# Patient Record
Sex: MALE | Race: WHITE | NOT HISPANIC OR LATINO
[De-identification: names, ages, dates, MRNs, and addresses within clinical notes are randomized per-mention and may not be internally consistent; named-entity substitution may affect disease eponyms.]

---

## 2021-11-17 ENCOUNTER — NON-APPOINTMENT (OUTPATIENT)
Age: 71
End: 2021-11-17

## 2021-11-19 ENCOUNTER — APPOINTMENT (OUTPATIENT)
Dept: OPHTHALMOLOGY | Facility: CLINIC | Age: 71
End: 2021-11-19
Payer: COMMERCIAL

## 2021-11-19 ENCOUNTER — NON-APPOINTMENT (OUTPATIENT)
Age: 71
End: 2021-11-19

## 2021-11-19 PROCEDURE — 92025 CPTRIZED CORNEAL TOPOGRAPHY: CPT

## 2021-11-19 PROCEDURE — 92004 COMPRE OPH EXAM NEW PT 1/>: CPT

## 2021-11-19 PROCEDURE — 92136 OPHTHALMIC BIOMETRY: CPT

## 2021-12-06 PROBLEM — Z00.00 ENCOUNTER FOR PREVENTIVE HEALTH EXAMINATION: Status: ACTIVE | Noted: 2021-12-06

## 2022-01-20 ENCOUNTER — APPOINTMENT (OUTPATIENT)
Dept: OPHTHALMOLOGY | Facility: AMBULATORY SURGERY CENTER | Age: 72
End: 2022-01-20

## 2022-01-21 ENCOUNTER — APPOINTMENT (OUTPATIENT)
Dept: OPHTHALMOLOGY | Facility: CLINIC | Age: 72
End: 2022-01-21

## 2022-01-26 ENCOUNTER — APPOINTMENT (OUTPATIENT)
Dept: OPHTHALMOLOGY | Facility: CLINIC | Age: 72
End: 2022-01-26

## 2022-02-16 ENCOUNTER — APPOINTMENT (OUTPATIENT)
Dept: OPHTHALMOLOGY | Facility: CLINIC | Age: 72
End: 2022-02-16

## 2022-08-24 ENCOUNTER — EMERGENCY (EMERGENCY)
Facility: HOSPITAL | Age: 72
LOS: 1 days | Discharge: HOME | End: 2022-08-24
Attending: EMERGENCY MEDICINE | Admitting: EMERGENCY MEDICINE
Payer: COMMERCIAL

## 2022-08-24 VITALS
TEMPERATURE: 98 F | RESPIRATION RATE: 16 BRPM | DIASTOLIC BLOOD PRESSURE: 72 MMHG | HEART RATE: 57 BPM | OXYGEN SATURATION: 97 % | SYSTOLIC BLOOD PRESSURE: 189 MMHG

## 2022-08-24 VITALS
DIASTOLIC BLOOD PRESSURE: 90 MMHG | TEMPERATURE: 98 F | WEIGHT: 175.05 LBS | SYSTOLIC BLOOD PRESSURE: 188 MMHG | HEIGHT: 64 IN | OXYGEN SATURATION: 97 % | HEART RATE: 81 BPM | RESPIRATION RATE: 14 BRPM

## 2022-08-24 DIAGNOSIS — Z98.890 OTHER SPECIFIED POSTPROCEDURAL STATES: Chronic | ICD-10-CM

## 2022-08-24 LAB
ALBUMIN SERPL ELPH-MCNC: 3.9 G/DL — SIGNIFICANT CHANGE UP (ref 3.3–5)
ALP SERPL-CCNC: 97 U/L — SIGNIFICANT CHANGE UP (ref 40–120)
ALT FLD-CCNC: 17 U/L — SIGNIFICANT CHANGE UP (ref 12–78)
ANION GAP SERPL CALC-SCNC: 5 MMOL/L — SIGNIFICANT CHANGE UP (ref 5–17)
APPEARANCE UR: CLEAR — SIGNIFICANT CHANGE UP
AST SERPL-CCNC: 24 U/L — SIGNIFICANT CHANGE UP (ref 15–37)
BASOPHILS # BLD AUTO: 0.05 K/UL — SIGNIFICANT CHANGE UP (ref 0–0.2)
BASOPHILS NFR BLD AUTO: 0.4 % — SIGNIFICANT CHANGE UP (ref 0–2)
BILIRUB SERPL-MCNC: 0.5 MG/DL — SIGNIFICANT CHANGE UP (ref 0.2–1.2)
BILIRUB UR-MCNC: NEGATIVE — SIGNIFICANT CHANGE UP
BUN SERPL-MCNC: 23 MG/DL — SIGNIFICANT CHANGE UP (ref 7–23)
CALCIUM SERPL-MCNC: 9 MG/DL — SIGNIFICANT CHANGE UP (ref 8.5–10.1)
CHLORIDE SERPL-SCNC: 109 MMOL/L — HIGH (ref 96–108)
CO2 SERPL-SCNC: 26 MMOL/L — SIGNIFICANT CHANGE UP (ref 22–31)
COLOR SPEC: SIGNIFICANT CHANGE UP
CREAT SERPL-MCNC: 1.7 MG/DL — HIGH (ref 0.5–1.3)
DIFF PNL FLD: ABNORMAL
EGFR: 42 ML/MIN/1.73M2 — LOW
EOSINOPHIL # BLD AUTO: 0.1 K/UL — SIGNIFICANT CHANGE UP (ref 0–0.5)
EOSINOPHIL NFR BLD AUTO: 0.8 % — SIGNIFICANT CHANGE UP (ref 0–6)
GLUCOSE SERPL-MCNC: 119 MG/DL — HIGH (ref 70–99)
GLUCOSE UR QL: NEGATIVE — SIGNIFICANT CHANGE UP
HCT VFR BLD CALC: 43.3 % — SIGNIFICANT CHANGE UP (ref 39–50)
HGB BLD-MCNC: 14.5 G/DL — SIGNIFICANT CHANGE UP (ref 13–17)
IMM GRANULOCYTES NFR BLD AUTO: 0.5 % — SIGNIFICANT CHANGE UP (ref 0–1.5)
KETONES UR-MCNC: NEGATIVE — SIGNIFICANT CHANGE UP
LEUKOCYTE ESTERASE UR-ACNC: NEGATIVE — SIGNIFICANT CHANGE UP
LYMPHOCYTES # BLD AUTO: 1.4 K/UL — SIGNIFICANT CHANGE UP (ref 1–3.3)
LYMPHOCYTES # BLD AUTO: 10.9 % — LOW (ref 13–44)
MCHC RBC-ENTMCNC: 30.3 PG — SIGNIFICANT CHANGE UP (ref 27–34)
MCHC RBC-ENTMCNC: 33.5 GM/DL — SIGNIFICANT CHANGE UP (ref 32–36)
MCV RBC AUTO: 90.4 FL — SIGNIFICANT CHANGE UP (ref 80–100)
MONOCYTES # BLD AUTO: 1.16 K/UL — HIGH (ref 0–0.9)
MONOCYTES NFR BLD AUTO: 9 % — SIGNIFICANT CHANGE UP (ref 2–14)
NEUTROPHILS # BLD AUTO: 10.1 K/UL — HIGH (ref 1.8–7.4)
NEUTROPHILS NFR BLD AUTO: 78.4 % — HIGH (ref 43–77)
NITRITE UR-MCNC: NEGATIVE — SIGNIFICANT CHANGE UP
NRBC # BLD: 0 /100 WBCS — SIGNIFICANT CHANGE UP (ref 0–0)
PH UR: 7 — SIGNIFICANT CHANGE UP (ref 5–8)
PLATELET # BLD AUTO: 216 K/UL — SIGNIFICANT CHANGE UP (ref 150–400)
POTASSIUM SERPL-MCNC: 3.7 MMOL/L — SIGNIFICANT CHANGE UP (ref 3.5–5.3)
POTASSIUM SERPL-SCNC: 3.7 MMOL/L — SIGNIFICANT CHANGE UP (ref 3.5–5.3)
PROT SERPL-MCNC: 7.9 G/DL — SIGNIFICANT CHANGE UP (ref 6–8.3)
PROT UR-MCNC: 15
RBC # BLD: 4.79 M/UL — SIGNIFICANT CHANGE UP (ref 4.2–5.8)
RBC # FLD: 14.1 % — SIGNIFICANT CHANGE UP (ref 10.3–14.5)
SODIUM SERPL-SCNC: 140 MMOL/L — SIGNIFICANT CHANGE UP (ref 135–145)
SP GR SPEC: 1 — LOW (ref 1.01–1.02)
UROBILINOGEN FLD QL: NEGATIVE — SIGNIFICANT CHANGE UP
WBC # BLD: 12.87 K/UL — HIGH (ref 3.8–10.5)
WBC # FLD AUTO: 12.87 K/UL — HIGH (ref 3.8–10.5)

## 2022-08-24 PROCEDURE — 99285 EMERGENCY DEPT VISIT HI MDM: CPT | Mod: 25

## 2022-08-24 PROCEDURE — 74177 CT ABD & PELVIS W/CONTRAST: CPT | Mod: 26,MA

## 2022-08-24 PROCEDURE — 81001 URINALYSIS AUTO W/SCOPE: CPT

## 2022-08-24 PROCEDURE — 93005 ELECTROCARDIOGRAM TRACING: CPT

## 2022-08-24 PROCEDURE — 80053 COMPREHEN METABOLIC PANEL: CPT

## 2022-08-24 PROCEDURE — 99285 EMERGENCY DEPT VISIT HI MDM: CPT

## 2022-08-24 PROCEDURE — 87086 URINE CULTURE/COLONY COUNT: CPT

## 2022-08-24 PROCEDURE — 96374 THER/PROPH/DIAG INJ IV PUSH: CPT | Mod: XU

## 2022-08-24 PROCEDURE — 85025 COMPLETE CBC W/AUTO DIFF WBC: CPT

## 2022-08-24 PROCEDURE — 96375 TX/PRO/DX INJ NEW DRUG ADDON: CPT

## 2022-08-24 PROCEDURE — 74177 CT ABD & PELVIS W/CONTRAST: CPT | Mod: MA

## 2022-08-24 PROCEDURE — 36415 COLL VENOUS BLD VENIPUNCTURE: CPT

## 2022-08-24 PROCEDURE — 93010 ELECTROCARDIOGRAM REPORT: CPT

## 2022-08-24 RX ORDER — ACETAMINOPHEN 500 MG
1000 TABLET ORAL ONCE
Refills: 0 | Status: COMPLETED | OUTPATIENT
Start: 2022-08-24 | End: 2022-08-24

## 2022-08-24 RX ORDER — SODIUM CHLORIDE 9 MG/ML
1000 INJECTION INTRAMUSCULAR; INTRAVENOUS; SUBCUTANEOUS ONCE
Refills: 0 | Status: COMPLETED | OUTPATIENT
Start: 2022-08-24 | End: 2022-08-24

## 2022-08-24 RX ORDER — CEFUROXIME AXETIL 250 MG
1 TABLET ORAL
Qty: 14 | Refills: 0
Start: 2022-08-24 | End: 2022-08-30

## 2022-08-24 RX ORDER — ONDANSETRON 8 MG/1
4 TABLET, FILM COATED ORAL ONCE
Refills: 0 | Status: COMPLETED | OUTPATIENT
Start: 2022-08-24 | End: 2022-08-24

## 2022-08-24 RX ORDER — KETOROLAC TROMETHAMINE 30 MG/ML
15 SYRINGE (ML) INJECTION ONCE
Refills: 0 | Status: DISCONTINUED | OUTPATIENT
Start: 2022-08-24 | End: 2022-08-24

## 2022-08-24 RX ORDER — TAMSULOSIN HYDROCHLORIDE 0.4 MG/1
1 CAPSULE ORAL
Qty: 7 | Refills: 0
Start: 2022-08-24 | End: 2022-08-30

## 2022-08-24 RX ORDER — MORPHINE SULFATE 50 MG/1
4 CAPSULE, EXTENDED RELEASE ORAL ONCE
Refills: 0 | Status: DISCONTINUED | OUTPATIENT
Start: 2022-08-24 | End: 2022-08-24

## 2022-08-24 RX ORDER — OXYCODONE AND ACETAMINOPHEN 5; 325 MG/1; MG/1
1 TABLET ORAL
Qty: 12 | Refills: 0
Start: 2022-08-24 | End: 2022-08-26

## 2022-08-24 RX ADMIN — MORPHINE SULFATE 4 MILLIGRAM(S): 50 CAPSULE, EXTENDED RELEASE ORAL at 12:11

## 2022-08-24 RX ADMIN — ONDANSETRON 4 MILLIGRAM(S): 8 TABLET, FILM COATED ORAL at 12:11

## 2022-08-24 RX ADMIN — Medication 15 MILLIGRAM(S): at 13:28

## 2022-08-24 RX ADMIN — SODIUM CHLORIDE 1000 MILLILITER(S): 9 INJECTION INTRAMUSCULAR; INTRAVENOUS; SUBCUTANEOUS at 10:53

## 2022-08-24 RX ADMIN — Medication 400 MILLIGRAM(S): at 11:15

## 2022-08-24 NOTE — ED ADULT NURSE NOTE - NSICDXPASTMEDICALHX_GEN_ALL_CORE_FT
PAST MEDICAL HISTORY:  COPD exacerbation     Diverticulitis     Former smoker     Prostate cancer

## 2022-08-24 NOTE — ED PROVIDER NOTE - CLINICAL SUMMARY MEDICAL DECISION MAKING FREE TEXT BOX
Lurdes: pt here for abdominal pain, dx with kidney stone based on CT scan findings, now feeling much better, stable for discharge.

## 2022-08-24 NOTE — ED ADULT TRIAGE NOTE - CHIEF COMPLAINT QUOTE
"Since Monday Erin had bad pains in my stomach. I have a hx of diverticulitis so my doctor told me I need a cat scan"

## 2022-08-24 NOTE — ED PROVIDER NOTE - PATIENT PORTAL LINK FT
You can access the FollowMyHealth Patient Portal offered by [facility] by registering at the following website: [email or web address]. By joining [facility]’s FollowMyHealth portal, you will also be able to view your health information using other applications (apps) compatible with our system.

## 2022-08-24 NOTE — ED ADULT NURSE NOTE - OBJECTIVE STATEMENT
Rceived pt in bed alert and oriented x4.  C/O abdominal pain Received pt in bed alert and oriented x4.  C/O abdominal pain left lower abdominal pain which started 3 days ago.  Pt also had constipation.  pt denies any n/v/d.  Pt did call his gastro dr who was away who gave them covering dr who told pt to go to Er for eval.  Pt does have hx of diverticulitis with hospitalization previously.  Denies chest pain, sob.

## 2022-08-24 NOTE — ED PROVIDER NOTE - NS ED ATTENDING STATEMENT MOD
This was a shared visit with the ERICH. I reviewed and verified the documentation and independently performed the documented:

## 2022-08-24 NOTE — ED PROVIDER NOTE - NSFOLLOWUPINSTRUCTIONS_ED_ALL_ED_FT
Drink plenty of water  follow up with urology  return to er for any worsening symptoms        KIDNEY STONES - AfterCare(R) Instructions(ER/ED)           Kidney Stones    WHAT YOU NEED TO KNOW:    Kidney stones form in the urinary system when the water and waste in your urine are out of balance. When this happens, certain types of waste crystals separate from the urine. The crystals build up and form kidney stones. You may have more than one kidney stone.  Kidney Stones          DISCHARGE INSTRUCTIONS:    Return to the emergency department if:   •You are vomiting and it is not relieved with medicine.          Call your doctor or kidney specialist if:   •You have a fever.      •You have trouble urinating.      •You see blood in your urine.      •You have severe pain.      •You have any questions or concerns about your condition or care.      Medicines: You may need any of the following:  •NSAIDs, such as ibuprofen, help decrease swelling, pain, and fever. This medicine is available with or without a doctor's order. NSAIDs can cause stomach bleeding or kidney problems in certain people. If you take blood thinner medicine, always ask your healthcare provider if NSAIDs are safe for you. Always read the medicine label and follow directions.      •Acetaminophen decreases pain and fever. It is available without a doctor's order. Ask how much to take and how often to take it. Follow directions. Read the labels of all other medicines you are using to see if they also contain acetaminophen, or ask your doctor or pharmacist. Acetaminophen can cause liver damage if not taken correctly.      •Prescription pain medicine may be given. Ask your healthcare provider how to take this medicine safely. Some prescription pain medicines contain acetaminophen. Do not take other medicines that contain acetaminophen without talking to your healthcare provider. Too much acetaminophen may cause liver damage. Prescription pain medicine may cause constipation. Ask your healthcare provider how to prevent or treat constipation.       •Medicines to balance your electrolytes may be needed.      •Take your medicine as directed. Contact your healthcare provider if you think your medicine is not helping or if you have side effects. Tell him or her if you are allergic to any medicine. Keep a list of the medicines, vitamins, and herbs you take. Include the amounts, and when and why you take them. Bring the list or the pill bottles to follow-up visits. Carry your medicine list with you in case of an emergency.      What you can do to manage kidney stones:   •Drink more liquids. Your healthcare provider may tell you to drink at least 8 to 12 (eight-ounce) cups of liquids each day. This helps flush out the kidney stones when you urinate. Water is the best liquid to drink.      •Strain your urine every time you go to the bathroom. Urinate through a strainer or a piece of thin cloth to catch the stones. Take the stones to your healthcare provider so they can be sent to the lab for tests. This will help your healthcare providers plan the best treatment for you.  Look for Stones in the Filter           •Ask if you should avoid any foods. You may need to limit oxalate. Oxalate is a chemical found in some plant foods. The most common type of kidney stone is made up of crystals that contain calcium and oxalate. Your healthcare provider or dietitian may recommend that you limit oxalate if you get this type of kidney stone often. You may need to limit how much sodium (salt) or protein you eat. Ask for information about the best foods for you.  High Oxalate Foods           •Be physically active as directed. Your stones may pass more easily if you stay active. Physical activity can also help you manage your weight. Ask about the best activities for you.  [employer] Walking for Exercise           After you pass the kidney stones: Your healthcare provider may order a 24-hour urine test. Results from a 24-hour urine test will help your healthcare provider plan ways to prevent more stones from forming. Your healthcare provider will give you more instructions.    Follow up with your doctor or kidney specialist as directed: Write down your questions so you remember to ask them during your visits.       © Copyright [employer] 2022           back to top                          © Copyright [employer] 2022

## 2022-08-24 NOTE — ED PROVIDER NOTE - OBJECTIVE STATEMENT
Patient is a 72-year-old male with past medical history of COPD former smoker diverticulitis prostate cancer status post prostatectomy here for 3 days of constipation and left lower quadrant pain.  Patient denies any nausea or vomiting positive mild chills.  Patient states called GI Dr. Arteaga who is on vacation partner advised to come to the ER.  Patient states also having increased urinary frequency urgency but states has bladder issues since surgery.  Denies any dysuria.  Patient states had a very small bowel movement yesterday. Pt states feels like previous diverticulitis.

## 2022-08-24 NOTE — ED PROVIDER NOTE - CARE PROVIDER_API CALL
Mateusz Villanueva  UROLOGY  [address], [address]  [place] [address]  Phone: [phone]  Fax: [phone]  Follow Up Time:

## 2022-08-24 NOTE — ED ADULT NURSE NOTE - NSIMPLEMENTINTERV_GEN_ALL_ED
Implemented All Universal Safety Interventions:  [place] to call system. Call bell, personal items and telephone within reach. Instruct patient to call for assistance. Room bathroom lighting operational. Non-slip footwear when patient is off stretcher. Physically safe environment: no spills, clutter or unnecessary equipment. Stretcher in lowest position, wheels locked, appropriate side rails in place.

## 2022-08-25 LAB
CULTURE RESULTS: NO GROWTH — SIGNIFICANT CHANGE UP
SPECIMEN SOURCE: SIGNIFICANT CHANGE UP

## 2023-11-17 PROBLEM — C61 MALIGNANT NEOPLASM OF PROSTATE: Chronic | Status: ACTIVE | Noted: 2022-08-24

## 2023-11-17 PROBLEM — Z87.891 PERSONAL HISTORY OF NICOTINE DEPENDENCE: Chronic | Status: ACTIVE | Noted: 2022-08-24

## 2023-12-07 ENCOUNTER — NON-APPOINTMENT (OUTPATIENT)
Age: 73
End: 2023-12-07

## 2023-12-07 ENCOUNTER — APPOINTMENT (OUTPATIENT)
Dept: THORACIC SURGERY | Facility: CLINIC | Age: 73
End: 2023-12-07
Payer: COMMERCIAL

## 2023-12-07 VITALS
HEART RATE: 92 BPM | TEMPERATURE: 98.4 F | SYSTOLIC BLOOD PRESSURE: 148 MMHG | HEIGHT: 63 IN | RESPIRATION RATE: 16 BRPM | DIASTOLIC BLOOD PRESSURE: 76 MMHG | BODY MASS INDEX: 31.18 KG/M2 | WEIGHT: 176 LBS | OXYGEN SATURATION: 97 %

## 2023-12-07 DIAGNOSIS — J43.9 EMPHYSEMA, UNSPECIFIED: ICD-10-CM

## 2023-12-07 DIAGNOSIS — Z87.891 PERSONAL HISTORY OF NICOTINE DEPENDENCE: ICD-10-CM

## 2023-12-07 DIAGNOSIS — R94.2 ABNORMAL RESULTS OF PULMONARY FUNCTION STUDIES: ICD-10-CM

## 2023-12-07 DIAGNOSIS — R06.02 SHORTNESS OF BREATH: ICD-10-CM

## 2023-12-07 DIAGNOSIS — Z86.010 PERSONAL HISTORY OF COLONIC POLYPS: ICD-10-CM

## 2023-12-07 DIAGNOSIS — Z87.19 PERSONAL HISTORY OF OTHER DISEASES OF THE DIGESTIVE SYSTEM: ICD-10-CM

## 2023-12-07 DIAGNOSIS — R91.1 SOLITARY PULMONARY NODULE: ICD-10-CM

## 2023-12-07 DIAGNOSIS — N20.0 CALCULUS OF KIDNEY: ICD-10-CM

## 2023-12-07 DIAGNOSIS — Z80.3 FAMILY HISTORY OF MALIGNANT NEOPLASM OF BREAST: ICD-10-CM

## 2023-12-07 DIAGNOSIS — Z83.6 FAMILY HISTORY OF OTHER DISEASES OF THE RESPIRATORY SYSTEM: ICD-10-CM

## 2023-12-07 DIAGNOSIS — Z85.46 PERSONAL HISTORY OF MALIGNANT NEOPLASM OF PROSTATE: ICD-10-CM

## 2023-12-07 DIAGNOSIS — R35.0 FREQUENCY OF MICTURITION: ICD-10-CM

## 2023-12-07 PROCEDURE — 99205 OFFICE O/P NEW HI 60 MIN: CPT

## 2023-12-08 PROBLEM — Z87.891 FORMER SMOKER: Status: ACTIVE | Noted: 2023-12-08

## 2023-12-08 PROBLEM — R35.0 URINARY FREQUENCY: Status: ACTIVE | Noted: 2023-12-08

## 2023-12-08 PROBLEM — Z87.19 HISTORY OF DIVERTICULITIS OF COLON: Status: RESOLVED | Noted: 2023-12-08 | Resolved: 2023-12-08

## 2023-12-08 PROBLEM — Z85.46 HISTORY OF MALIGNANT NEOPLASM OF PROSTATE: Status: RESOLVED | Noted: 2023-12-08 | Resolved: 2023-12-08

## 2023-12-08 PROBLEM — Z80.3 FAMILY HISTORY OF MALIGNANT NEOPLASM OF BREAST: Status: ACTIVE | Noted: 2023-12-08

## 2023-12-08 PROBLEM — R91.1 LUNG NODULE: Status: ACTIVE | Noted: 2023-12-05

## 2023-12-08 PROBLEM — J43.9 HISTORY OF PULMONARY EMPHYSEMA: Status: RESOLVED | Noted: 2023-12-08 | Resolved: 2023-12-08

## 2023-12-08 PROBLEM — N20.0 MULTIPLE KIDNEY STONES: Status: RESOLVED | Noted: 2023-12-08 | Resolved: 2023-12-08

## 2023-12-08 PROBLEM — Z83.6 FAMILY HISTORY OF LUNG DISEASE: Status: ACTIVE | Noted: 2023-12-08

## 2023-12-08 PROBLEM — R94.2 ABNORMAL PET OF RIGHT LUNG: Status: ACTIVE | Noted: 2023-12-08

## 2023-12-08 PROBLEM — R06.02 SOB (SHORTNESS OF BREATH) ON EXERTION: Status: ACTIVE | Noted: 2023-12-08

## 2023-12-08 PROBLEM — Z86.010 HISTORY OF COLONIC POLYPS: Status: RESOLVED | Noted: 2023-12-08 | Resolved: 2023-12-08

## 2023-12-08 RX ORDER — DONEPEZIL HYDROCHLORIDE 10 MG/1
10 TABLET ORAL DAILY
Refills: 0 | Status: ACTIVE | COMMUNITY

## 2023-12-08 RX ORDER — LOVASTATIN 10 MG/1
10 TABLET ORAL
Refills: 0 | Status: ACTIVE | COMMUNITY

## 2023-12-08 RX ORDER — SOLIFENACIN SUCCINATE 10 MG/1
10 TABLET ORAL DAILY
Refills: 0 | Status: ACTIVE | COMMUNITY

## 2023-12-08 RX ORDER — AMLODIPINE BESYLATE 5 MG/1
5 TABLET ORAL DAILY
Refills: 0 | Status: ACTIVE | COMMUNITY

## 2023-12-08 RX ORDER — ALBUTEROL 90 MCG
90 AEROSOL (GRAM) INHALATION
Refills: 0 | Status: ACTIVE | COMMUNITY

## 2023-12-08 RX ORDER — BUDESONIDE, GLYCOPYRROLATE, AND FORMOTEROL FUMARATE 160; 9; 4.8 UG/1; UG/1; UG/1
160-9-4.8 AEROSOL, METERED RESPIRATORY (INHALATION) TWICE DAILY
Refills: 0 | Status: ACTIVE | COMMUNITY

## 2023-12-27 ENCOUNTER — LABORATORY RESULT (OUTPATIENT)
Age: 73
End: 2023-12-27

## 2023-12-27 NOTE — H&P ADULT - NSICDXFAMILYHX_GEN_ALL_CORE_FT
FAMILY HISTORY:  FH: malignant neoplasm of breast    Father  Still living? Unknown  FH: lung disease, Age at diagnosis: Age Unknown

## 2023-12-27 NOTE — H&P ADULT - NSHPSOCIALHISTORY_GEN_ALL_CORE
Pt is  and lives with his spouse. He is still employed as a . He is vaccinated against COVID-19 and denies hx of TB. He denies recreational drug use and is a former smoker. Pt reports exposure to the events of 9-11 ([facility]). Pt is  and lives with his spouse. He is still employed as a . He is vaccinated against COVID-19 and denies hx of TB. He denies recreational drug use and is a former smoker. Pt reports exposure to the events of 9-11 ([facility]).

## 2023-12-27 NOTE — H&P ADULT - ASSESSMENT
73-year-old M, former smoker (quit 16 years ago, smoked for 30 yers 1.5 ppd), with PMHx of prostate cancer s/p prostatectomy, copd, and urinary frequency. Who is referred by DR. MARY FERNANDEZ for an initial evaluation of a pulmonary nodule in the posterior right lung apex.    Chest imaging including chest CT from 11/2023 and PET-CT from 11/28/2023 reviewed with the patient and shows an FDG avid 2.6 cm nodule highly suspicious for cancer.    Options include:    1. CT guided biopsy/Navigational Bronchoscopy  2. Right Robotic/Video Assisted Thoracoscopic Surgery (VATS) resection    He needs a tissue diagnosis given its size and FDG avidity. He wishes to have it resected which he will be tolerate given his excellent functional status and PFTs.    The risks and benefits of the Right Robotic Assisted/VATS RUL wedge, possible lobectomy, possible thoracotomy procedure were discussed at length including but not limited to risks of infection, bleeding, need for thoracotomy, arrhythmias, thromboembolic complications, myocardial infarction, need for repetitive procedures, recurrent laryngeal nerve injury leading to hoarseness, as well as risks of anesthesia. Specific risks discussed included risk of benign diagnosis, recurrence if malignant, prolonged air leak, need for chest tube drainage, need for adjuvant therapy, and the need for surveillance.    He wishes to proceed with plan. He will need a cardiology clearance prior to surgery.    Plan:  Right Robotic Assisted/VATS RUL wedge, possible lobectomy, possible thoracotomy procedure  Cardiology clearance

## 2023-12-27 NOTE — H&P ADULT - SOCIAL HISTORY
Post Anesthetic Evaluation


Cardiovascular Status: Normal, Stable


Respiratory Status: Normal, Stable


Level of Consciousness/Mental Status: Can Participate in Eval


Pain Control: Adequate, Prn Tx Ordered


Nausea/Vomiting Control: Adequate, Prn Tx Ordered


Complications Possibly Related to Anesthesia: None Noted No

## 2023-12-27 NOTE — H&P ADULT - HISTORY OF PRESENT ILLNESS
73-year-old M, former smoker (quit 16 years ago, smoked for 30 yers 1.5 ppd), with PMHx of prostate cancer s/p prostatectomy, copd, and urinary frequency. Who is referred by DR. MARY FERNANDEZ for an initial evaluation of pulmonary nodule. Patient reports some sob on exertion, he is otherwise in his usual state of health. Patient denies, hemoptysis, weight change, nausea, vomiting, diarrhea, chest pain, fever, or any recent illnesses. 73-year-old M, former smoker (quit 16 years ago, smoked for 30 yers 1.5 ppd), with PMHx of prostate cancer s/p prostatectomy, copd, and urinary frequency. Who is referred by DR. MARY FERNANDEZ for an initial evaluation of pulmonary nodule. Patient reports some sob on exertion, he is otherwise in his usual state of health. Patient denies, hemoptysis, weight change, nausea, vomiting, diarrhea, chest pain, fever, or any recent illnesses.    Patient seen in same day holding area; Reports no changes to PMHx since last seen by our team. Denies acute or current SOB, chest pain, palpitation, N/V/D, fever/chills, recent illness, or any other concerning symptoms.    Patient was previously taking Breztri inhaler and has been changed to Trilegy due to insurance coverage.    Admit under   via same day surgery. Consent signed, placed on chart.  Risks/benefits reviewed, patient understands and agrees. T&S ordered and blood products placed on hold for OR.  To 9  post-op.

## 2023-12-27 NOTE — H&P ADULT - NSHPLABSRESULTS_GEN_ALL_CORE
CT chest on 11/03/2023  1.Interval development of an irregular shaped pulmonary nodule with spiculated margins at the posterior right lung apex. Very suspicious for lung cancer. The nodule measures 2.1 x 1.7 x 2.6 cm.  2. Stable 0.3 cm pulmonary density in the right middle lobe since 2014.  3. Mild generalized bronchial airway wall thickening suggesting bronchitis.  4. Coronary artery calcifications.  5. Possible partially visualized stone in the left kidney.    MRI brain on 12/01/2023:  Scattered white matter foci of abnormal signal are nonspecific but are likely due to age-related microvascular ischemic disease.    PET/CT on 11/28/2023:  1. Abnormal robust uptake associated with the recently seen new spiculated right upper lobe lung mass. Primary bronchogenic malignancy is suspected. Pathologic confirmation is warranted.  New visualization of a mildly hypermetabolic 7 mm solid nodule in the medial right middle lobe is more nonspecific in appearance, likely too small to sample percutaneously, and should be closely followed.  2. Should primary lung cancer be proven, no evidence to suggest regional lilliana involvement in the chest. No evidence of FDG avid malignancy in the rest of the body.  3. Other incidental findings without abnormal hypermetabolism including architectural changes in the rest of the lungs, status post cataract surgery, atrophic versus resected prostate, Non obstructing renal calculi, and degenerative disease. Any dedicated imaging should be based on patient's clinical course.

## 2023-12-27 NOTE — H&P ADULT - NSHPPHYSICALEXAM_GEN_ALL_CORE
WT: 176lbs    ECOG Performance Status: Status 0 - Fully active, able to carry on all pre-disease performance without restriction.     Constitutional: alert and in no acute distress.  Eyes: the sclera and conjunctiva were normal, pupils were equal in size, round, and reactive to light and extraocular movements were intact.  ENT: the ears and nose were normal in appearance . the oropharynx was normal.  Neck: the appearance of the neck was normal, no neck mass was observed, the thyroid was not enlarged and there were no palpable thyroid nodules . there was no jugular-venous distention.  Pulmonary: no respiratory distress, normal respiratory rhythm and effort and no accessory muscle use.  Heart: heart rate was normal and rhythm regular, normal S1 and S2, no gallops, no murmurs and no pericardial rub.  Chest: the chest was normal in appearance, no chest asymmetry and normal chest expansion.  Abdomen: normal bowel sounds, soft, non-tender, no hepato-splenomegaly and no abdominal mass palpated.  Lymphatics: The posterior cervical, anterior cervical, supraclavicular, axillary, femoral and inguinal nodes were non-tender and normal size.  Back: no costovertebral angle tenderness and no spinal tenderness.  Musculoskeletal: normal gait, no clubbing or cyanosis of the fingernails, no joint swelling seen and muscle strength and tone were normal.  Skin: normal skin color and pigmentation, normal skin turgor and no rash.  Neurological: deep tendon reflexes were 2+ and symmetric, the sensory exam was normal to light touch and pinprick and no focal deficits.  Psychiatric: oriented to person, place, and time, insight and judgment were intact and the affect was normal.

## 2023-12-27 NOTE — H&P ADULT - NSICDXPASTMEDICALHX_GEN_ALL_CORE_FT
PAST MEDICAL HISTORY:  Abnormal positron emission tomography (PET) scan     COPD exacerbation     Diverticulitis     Former smoker     History of colonic polyps     Lung nodule     Malignant neoplasm of prostate     Multiple kidney stones     Prostate cancer     Pulmonary emphysema     SOB (shortness of breath) on exertion     Urinary frequency

## 2024-01-09 ENCOUNTER — TRANSCRIPTION ENCOUNTER (OUTPATIENT)
Age: 74
End: 2024-01-09

## 2024-01-09 VITALS
SYSTOLIC BLOOD PRESSURE: 152 MMHG | HEART RATE: 62 BPM | TEMPERATURE: 97 F | RESPIRATION RATE: 17 BRPM | OXYGEN SATURATION: 96 % | HEIGHT: 64 IN | WEIGHT: 183.2 LBS | DIASTOLIC BLOOD PRESSURE: 73 MMHG

## 2024-01-09 RX ORDER — SOLIFENACIN SUCCINATE 10 MG/1
1 TABLET ORAL
Refills: 0 | DISCHARGE

## 2024-01-09 NOTE — PATIENT PROFILE ADULT - FUNCTIONAL ASSESSMENT - DAILY ACTIVITY 2.
Anticoagulation Summary  As of 2023      INR goal:  2.0-3.0   TTR:  82.9 % (6.9 y)   INR used for dosin.00 (2023)   Warfarin maintenance plan:  5 mg (5 mg x 1) every Mon; 7.5 mg (5 mg x 1.5) all other days   Weekly warfarin total:  50 mg   Plan last modified:  Petra Larson PharmD (3/30/2023)   Next INR check:  2023   Target end date:  Indefinite    Indications    Permanent atrial fibrillation (HCC) [I48.21]  Chronic anticoagulation [Z79.01]  Secondary hypercoagulable state (HCC) [D68.69]                 Anticoagulation Episode Summary       INR check location:  Anticoagulation Clinic    Preferred lab:      Send INR reminders to:      Comments:  Indomethacin as needed, discussed risks with the patient.          Anticoagulation Care Providers       Provider Role Specialty Phone number    Cheikh Turner M.D. Referring Cardiovascular Disease (Cardiology) [phone]    [facility] Responsible  [phone]                  Refer to Patient Findings for HPI:      Vitals:    23 1118   BP: (!) 147/80   Pulse: 82     Verified current warfarin dosing schedule.    Medications reconciled   Pt is not on antiplatelet therapy      A/P   INR  -therapeutic. INR up from 1.7 last visit.    Warfarin dosing recommendation: Continue current regimen.    Pt educated to contact our clinic with any changes in medications or s/s of bleeding or thrombosis. Pt is aware to seek immediate medical attention for falls, head injury or deep cuts.    Follow up appointment in 3 week(s) per pt's request due to going out of town for his 60th high school reunion.    PAMELA Bryan.     4 = No assist / stand by assistance

## 2024-01-09 NOTE — PATIENT PROFILE ADULT - FALL HARM RISK - UNIVERSAL INTERVENTIONS
Bed in lowest position, wheels locked, appropriate side rails in place/Call bell, personal items and telephone in reach/Instruct patient to call for assistance before getting out of bed or chair/Non-slip footwear when patient is out of bed/[place] to call system/Physically safe environment - no spills, clutter or unnecessary equipment/Purposeful Proactive Rounding/Room/bathroom lighting operational, light cord in reach Bed in lowest position, wheels locked, appropriate side rails in place/Call bell, personal items and telephone in reach/Instruct patient to call for assistance before getting out of bed or chair/Non-slip footwear when patient is out of bed/[place] to call system/Physically safe environment - no spills, clutter or unnecessary equipment/Purposeful Proactive Rounding/Room/bathroom lighting operational, light cord in reach

## 2024-01-09 NOTE — PRE-OP CHECKLIST - SELECT TESTS ORDERED
TTE; CT chest;MRI/BMP/CBC/PT/PTT/INR/Spirometry/Urinalysis TTE; CT chest;MRI/BMP/CBC/PT/PTT/INR/Spirometry/Type and Screen/Urinalysis

## 2024-01-10 ENCOUNTER — INPATIENT (INPATIENT)
Facility: HOSPITAL | Age: 74
LOS: 6 days | Discharge: HOME HEALTH | DRG: 164 | End: 2024-01-17
Attending: THORACIC SURGERY (CARDIOTHORACIC VASCULAR SURGERY) | Admitting: THORACIC SURGERY (CARDIOTHORACIC VASCULAR SURGERY)
Payer: COMMERCIAL

## 2024-01-10 ENCOUNTER — NON-APPOINTMENT (OUTPATIENT)
Age: 74
End: 2024-01-10

## 2024-01-10 ENCOUNTER — APPOINTMENT (OUTPATIENT)
Dept: THORACIC SURGERY | Facility: HOSPITAL | Age: 74
End: 2024-01-10

## 2024-01-10 ENCOUNTER — RESULT REVIEW (OUTPATIENT)
Age: 74
End: 2024-01-10

## 2024-01-10 ENCOUNTER — TRANSCRIPTION ENCOUNTER (OUTPATIENT)
Age: 74
End: 2024-01-10

## 2024-01-10 DIAGNOSIS — Z90.89 ACQUIRED ABSENCE OF OTHER ORGANS: Chronic | ICD-10-CM

## 2024-01-10 DIAGNOSIS — Z98.890 OTHER SPECIFIED POSTPROCEDURAL STATES: Chronic | ICD-10-CM

## 2024-01-10 DIAGNOSIS — H26.9 UNSPECIFIED CATARACT: Chronic | ICD-10-CM

## 2024-01-10 DIAGNOSIS — Z96.89 PRESENCE OF OTHER SPECIFIED FUNCTIONAL IMPLANTS: Chronic | ICD-10-CM

## 2024-01-10 LAB
ANION GAP SERPL CALC-SCNC: 9 MMOL/L — SIGNIFICANT CHANGE UP (ref 5–17)
ANION GAP SERPL CALC-SCNC: 9 MMOL/L — SIGNIFICANT CHANGE UP (ref 5–17)
APTT BLD: 29 SEC — SIGNIFICANT CHANGE UP (ref 24.5–35.6)
APTT BLD: 29 SEC — SIGNIFICANT CHANGE UP (ref 24.5–35.6)
BLD GP AB SCN SERPL QL: NEGATIVE — SIGNIFICANT CHANGE UP
BLD GP AB SCN SERPL QL: NEGATIVE — SIGNIFICANT CHANGE UP
BUN SERPL-MCNC: 19 MG/DL — SIGNIFICANT CHANGE UP (ref 7–23)
BUN SERPL-MCNC: 19 MG/DL — SIGNIFICANT CHANGE UP (ref 7–23)
CALCIUM SERPL-MCNC: 8.5 MG/DL — SIGNIFICANT CHANGE UP (ref 8.4–10.5)
CALCIUM SERPL-MCNC: 8.5 MG/DL — SIGNIFICANT CHANGE UP (ref 8.4–10.5)
CHLORIDE SERPL-SCNC: 104 MMOL/L — SIGNIFICANT CHANGE UP (ref 96–108)
CHLORIDE SERPL-SCNC: 104 MMOL/L — SIGNIFICANT CHANGE UP (ref 96–108)
CO2 SERPL-SCNC: 24 MMOL/L — SIGNIFICANT CHANGE UP (ref 22–31)
CO2 SERPL-SCNC: 24 MMOL/L — SIGNIFICANT CHANGE UP (ref 22–31)
CREAT SERPL-MCNC: 1.02 MG/DL — SIGNIFICANT CHANGE UP (ref 0.5–1.3)
CREAT SERPL-MCNC: 1.02 MG/DL — SIGNIFICANT CHANGE UP (ref 0.5–1.3)
EGFR: 77 ML/MIN/1.73M2 — SIGNIFICANT CHANGE UP
EGFR: 77 ML/MIN/1.73M2 — SIGNIFICANT CHANGE UP
GLUCOSE SERPL-MCNC: 158 MG/DL — HIGH (ref 70–99)
GLUCOSE SERPL-MCNC: 158 MG/DL — HIGH (ref 70–99)
INR BLD: 0.99 — SIGNIFICANT CHANGE UP (ref 0.85–1.18)
INR BLD: 0.99 — SIGNIFICANT CHANGE UP (ref 0.85–1.18)
POTASSIUM SERPL-MCNC: 4.2 MMOL/L — SIGNIFICANT CHANGE UP (ref 3.5–5.3)
POTASSIUM SERPL-MCNC: 4.2 MMOL/L — SIGNIFICANT CHANGE UP (ref 3.5–5.3)
POTASSIUM SERPL-SCNC: 4.2 MMOL/L — SIGNIFICANT CHANGE UP (ref 3.5–5.3)
POTASSIUM SERPL-SCNC: 4.2 MMOL/L — SIGNIFICANT CHANGE UP (ref 3.5–5.3)
PROTHROM AB SERPL-ACNC: 11.3 SEC — SIGNIFICANT CHANGE UP (ref 9.5–13)
PROTHROM AB SERPL-ACNC: 11.3 SEC — SIGNIFICANT CHANGE UP (ref 9.5–13)
RH IG SCN BLD-IMP: POSITIVE — SIGNIFICANT CHANGE UP
RH IG SCN BLD-IMP: POSITIVE — SIGNIFICANT CHANGE UP
SODIUM SERPL-SCNC: 137 MMOL/L — SIGNIFICANT CHANGE UP (ref 135–145)
SODIUM SERPL-SCNC: 137 MMOL/L — SIGNIFICANT CHANGE UP (ref 135–145)

## 2024-01-10 PROCEDURE — 88309 TISSUE EXAM BY PATHOLOGIST: CPT | Mod: 26

## 2024-01-10 PROCEDURE — 32663 THORACOSCOPY W/LOBECTOMY: CPT | Mod: 80

## 2024-01-10 PROCEDURE — 88341 IMHCHEM/IMCYTCHM EA ADD ANTB: CPT | Mod: 26

## 2024-01-10 PROCEDURE — 88305 TISSUE EXAM BY PATHOLOGIST: CPT | Mod: 26

## 2024-01-10 PROCEDURE — 71045 X-RAY EXAM CHEST 1 VIEW: CPT | Mod: 26

## 2024-01-10 PROCEDURE — S2900 ROBOTIC SURGICAL SYSTEM: CPT | Mod: NC

## 2024-01-10 PROCEDURE — 32668 THORACOSCOPY W/W RESECT DIAG: CPT

## 2024-01-10 PROCEDURE — 32674 THORACOSCOPY LYMPH NODE EXC: CPT | Mod: 80

## 2024-01-10 PROCEDURE — 32674 THORACOSCOPY LYMPH NODE EXC: CPT

## 2024-01-10 PROCEDURE — 88307 TISSUE EXAM BY PATHOLOGIST: CPT | Mod: 26

## 2024-01-10 PROCEDURE — 32663 THORACOSCOPY W/LOBECTOMY: CPT

## 2024-01-10 PROCEDURE — 32668 THORACOSCOPY W/W RESECT DIAG: CPT | Mod: 80

## 2024-01-10 PROCEDURE — 88331 PATH CONSLTJ SURG 1 BLK 1SPC: CPT | Mod: 26

## 2024-01-10 PROCEDURE — 88342 IMHCHEM/IMCYTCHM 1ST ANTB: CPT | Mod: 26

## 2024-01-10 DEVICE — SURGCEL 4 X 8": Type: IMPLANTABLE DEVICE | Site: RIGHT | Status: FUNCTIONAL

## 2024-01-10 DEVICE — XI STAPLER SUREFORM RELOAD 45 WHITE: Type: IMPLANTABLE DEVICE | Site: RIGHT | Status: FUNCTIONAL

## 2024-01-10 DEVICE — XI STAPLER SUREFORM RELOAD 45 BLACK: Type: IMPLANTABLE DEVICE | Site: RIGHT | Status: FUNCTIONAL

## 2024-01-10 DEVICE — CHEST DRAIN THORACIC PVC 28FR STRAIGHT: Type: IMPLANTABLE DEVICE | Site: RIGHT | Status: FUNCTIONAL

## 2024-01-10 RX ORDER — ACETAMINOPHEN 500 MG
1000 TABLET ORAL EVERY 6 HOURS
Refills: 0 | Status: DISCONTINUED | OUTPATIENT
Start: 2024-01-10 | End: 2024-01-17

## 2024-01-10 RX ORDER — DONEPEZIL HYDROCHLORIDE 10 MG/1
5 TABLET, FILM COATED ORAL AT BEDTIME
Refills: 0 | Status: DISCONTINUED | OUTPATIENT
Start: 2024-01-10 | End: 2024-01-17

## 2024-01-10 RX ORDER — FLUTICASONE FUROATE, UMECLIDINIUM BROMIDE AND VILANTEROL TRIFENATATE 200; 62.5; 25 UG/1; UG/1; UG/1
1 POWDER RESPIRATORY (INHALATION)
Refills: 0 | DISCHARGE

## 2024-01-10 RX ORDER — SODIUM CHLORIDE 9 MG/ML
1000 INJECTION, SOLUTION INTRAVENOUS
Refills: 0 | Status: DISCONTINUED | OUTPATIENT
Start: 2024-01-10 | End: 2024-01-11

## 2024-01-10 RX ORDER — MULTIVIT-MIN/FERROUS GLUCONATE 9 MG/15 ML
1 LIQUID (ML) ORAL
Refills: 0 | DISCHARGE

## 2024-01-10 RX ORDER — DONEPEZIL HYDROCHLORIDE 10 MG/1
0.5 TABLET, FILM COATED ORAL
Refills: 0 | DISCHARGE

## 2024-01-10 RX ORDER — IBUPROFEN 200 MG
400 TABLET ORAL EVERY 6 HOURS
Refills: 0 | Status: DISCONTINUED | OUTPATIENT
Start: 2024-01-10 | End: 2024-01-12

## 2024-01-10 RX ORDER — ACETAMINOPHEN 500 MG
975 TABLET ORAL ONCE
Refills: 0 | Status: COMPLETED | OUTPATIENT
Start: 2024-01-10 | End: 2024-01-10

## 2024-01-10 RX ORDER — HYDROMORPHONE HYDROCHLORIDE 2 MG/ML
0.25 INJECTION INTRAMUSCULAR; INTRAVENOUS; SUBCUTANEOUS
Refills: 0 | Status: DISCONTINUED | OUTPATIENT
Start: 2024-01-10 | End: 2024-01-13

## 2024-01-10 RX ORDER — FAMOTIDINE 10 MG/ML
20 INJECTION INTRAVENOUS EVERY 12 HOURS
Refills: 0 | Status: DISCONTINUED | OUTPATIENT
Start: 2024-01-10 | End: 2024-01-17

## 2024-01-10 RX ORDER — CEFAZOLIN SODIUM 1 G
2000 VIAL (EA) INJECTION EVERY 8 HOURS
Refills: 0 | Status: COMPLETED | OUTPATIENT
Start: 2024-01-10 | End: 2024-01-11

## 2024-01-10 RX ORDER — AMLODIPINE BESYLATE 2.5 MG/1
1 TABLET ORAL
Refills: 0 | DISCHARGE

## 2024-01-10 RX ORDER — ENOXAPARIN SODIUM 100 MG/ML
40 INJECTION SUBCUTANEOUS EVERY 24 HOURS
Refills: 0 | Status: DISCONTINUED | OUTPATIENT
Start: 2024-01-10 | End: 2024-01-17

## 2024-01-10 RX ORDER — ALBUTEROL 90 UG/1
2 AEROSOL, METERED ORAL
Refills: 0 | DISCHARGE

## 2024-01-10 RX ORDER — OXYCODONE HYDROCHLORIDE 5 MG/1
5 TABLET ORAL EVERY 4 HOURS
Refills: 0 | Status: DISCONTINUED | OUTPATIENT
Start: 2024-01-10 | End: 2024-01-16

## 2024-01-10 RX ORDER — METOPROLOL TARTRATE 50 MG
25 TABLET ORAL EVERY 12 HOURS
Refills: 0 | Status: DISCONTINUED | OUTPATIENT
Start: 2024-01-10 | End: 2024-01-13

## 2024-01-10 RX ORDER — HEPARIN SODIUM 5000 [USP'U]/ML
5000 INJECTION INTRAVENOUS; SUBCUTANEOUS ONCE
Refills: 0 | Status: COMPLETED | OUTPATIENT
Start: 2024-01-10 | End: 2024-01-10

## 2024-01-10 RX ORDER — SODIUM CHLORIDE 9 MG/ML
3 INJECTION INTRAMUSCULAR; INTRAVENOUS; SUBCUTANEOUS EVERY 8 HOURS
Refills: 0 | Status: DISCONTINUED | OUTPATIENT
Start: 2024-01-10 | End: 2024-01-10

## 2024-01-10 RX ORDER — LIDOCAINE 4 G/100G
1 CREAM TOPICAL EVERY 24 HOURS
Refills: 0 | Status: DISCONTINUED | OUTPATIENT
Start: 2024-01-10 | End: 2024-01-17

## 2024-01-10 RX ORDER — APREPITANT 80 MG/1
40 CAPSULE ORAL ONCE
Refills: 0 | Status: COMPLETED | OUTPATIENT
Start: 2024-01-10 | End: 2024-01-10

## 2024-01-10 RX ORDER — OXYBUTYNIN CHLORIDE 5 MG
5 TABLET ORAL
Refills: 0 | Status: DISCONTINUED | OUTPATIENT
Start: 2024-01-10 | End: 2024-01-17

## 2024-01-10 RX ORDER — SENNA PLUS 8.6 MG/1
2 TABLET ORAL AT BEDTIME
Refills: 0 | Status: DISCONTINUED | OUTPATIENT
Start: 2024-01-10 | End: 2024-01-17

## 2024-01-10 RX ORDER — IPRATROPIUM BROMIDE 0.2 MG/ML
500 SOLUTION, NON-ORAL INHALATION EVERY 6 HOURS
Refills: 0 | Status: DISCONTINUED | OUTPATIENT
Start: 2024-01-10 | End: 2024-01-17

## 2024-01-10 RX ORDER — POLYETHYLENE GLYCOL 3350 17 G/17G
17 POWDER, FOR SOLUTION ORAL ONCE
Refills: 0 | Status: COMPLETED | OUTPATIENT
Start: 2024-01-10 | End: 2024-01-10

## 2024-01-10 RX ORDER — FESOTERODINE FUMARATE 8 MG/1
1 TABLET, FILM COATED, EXTENDED RELEASE ORAL
Refills: 0 | DISCHARGE

## 2024-01-10 RX ADMIN — Medication 100 MILLIGRAM(S): at 15:49

## 2024-01-10 RX ADMIN — SENNA PLUS 2 TABLET(S): 8.6 TABLET ORAL at 22:34

## 2024-01-10 RX ADMIN — LIDOCAINE 1 PATCH: 4 CREAM TOPICAL at 17:49

## 2024-01-10 RX ADMIN — LIDOCAINE 1 PATCH: 4 CREAM TOPICAL at 18:41

## 2024-01-10 RX ADMIN — FAMOTIDINE 20 MILLIGRAM(S): 10 INJECTION INTRAVENOUS at 17:48

## 2024-01-10 RX ADMIN — ENOXAPARIN SODIUM 40 MILLIGRAM(S): 100 INJECTION SUBCUTANEOUS at 17:48

## 2024-01-10 RX ADMIN — Medication 100 MILLIGRAM(S): at 23:04

## 2024-01-10 RX ADMIN — Medication 5 MILLIGRAM(S): at 17:47

## 2024-01-10 RX ADMIN — Medication 1000 MILLIGRAM(S): at 15:37

## 2024-01-10 RX ADMIN — Medication 400 MILLIGRAM(S): at 17:47

## 2024-01-10 RX ADMIN — POLYETHYLENE GLYCOL 3350 17 GRAM(S): 17 POWDER, FOR SOLUTION ORAL at 17:47

## 2024-01-10 RX ADMIN — Medication 400 MILLIGRAM(S): at 23:05

## 2024-01-10 RX ADMIN — Medication 1000 MILLIGRAM(S): at 23:18

## 2024-01-10 RX ADMIN — APREPITANT 40 MILLIGRAM(S): 80 CAPSULE ORAL at 07:19

## 2024-01-10 RX ADMIN — Medication 975 MILLIGRAM(S): at 06:35

## 2024-01-10 RX ADMIN — HYDROMORPHONE HYDROCHLORIDE 0.25 MILLIGRAM(S): 2 INJECTION INTRAMUSCULAR; INTRAVENOUS; SUBCUTANEOUS at 12:38

## 2024-01-10 RX ADMIN — DONEPEZIL HYDROCHLORIDE 5 MILLIGRAM(S): 10 TABLET, FILM COATED ORAL at 22:33

## 2024-01-10 RX ADMIN — Medication 400 MILLIGRAM(S): at 23:18

## 2024-01-10 RX ADMIN — Medication 1000 MILLIGRAM(S): at 23:05

## 2024-01-10 RX ADMIN — HEPARIN SODIUM 5000 UNIT(S): 5000 INJECTION INTRAVENOUS; SUBCUTANEOUS at 06:34

## 2024-01-10 RX ADMIN — Medication 400 MILLIGRAM(S): at 18:41

## 2024-01-10 RX ADMIN — HYDROMORPHONE HYDROCHLORIDE 0.25 MILLIGRAM(S): 2 INJECTION INTRAMUSCULAR; INTRAVENOUS; SUBCUTANEOUS at 12:28

## 2024-01-10 RX ADMIN — Medication 1000 MILLIGRAM(S): at 14:14

## 2024-01-10 RX ADMIN — Medication 25 MILLIGRAM(S): at 17:48

## 2024-01-10 NOTE — PROGRESS NOTE ADULT - SUBJECTIVE AND OBJECTIVE BOX
Operation / Date:  1/10/24 RA, RVATS, right upper lobe wedge, completion right upper lobectomy, MLND    SUBJECTIVE ASSESSMENT:  Patient seen in PACU.  He is c/o pain, getting PRNs.  Otherwise Denies CP/SOB/N/V/D/dizziness/cough/fever/chills.        Vital Signs Last 24 Hrs  T(C): 36.1 (10 Joaquim 2024 12:08), Max: 36.3 (10 Joaquim 2024 07:14)  T(F): 97 (10 Joaquim 2024 12:08), Max: 97 (10 Joaquim 2024 12:08)  HR: 68 (10 Joaquim 2024 12:23) (62 - 69)  BP: 125/63 (10 Joaquim 2024 12:23) (125/59 - 152/73)  BP(mean): 89 (10 Joaquim 2024 12:23) (85 - 94)  RR: 19 (10 Joaquim 2024 12:23) (17 - 20)  SpO2: 96% (10 Joaquim 2024 12:23) (96% - 99%)    Parameters below as of 10 Joaquim 2024 12:23  Patient On (Oxygen Delivery Method): nasal cannula  O2 Flow (L/min): 3    I&O's Detail      CHEST TUBE:  Yes; AIR LEAKS: Yes. Suction   DIMAS: No.    PHYSICAL EXAM:    GEN: NAD, appropriately groggy  Psych: Mood appropriate  Neuro: A&Ox3.  No focal deficits.  Moving all extremities.   HEENT: No obvious abnormalities  CV: S1S2, regular, no murmurs appreciated.  No carotid bruits.  No JVD  Lungs: Clear B/L.  No wheezing, rales or rhonchi  ABD: Soft, non-tender, non-distended.  +Bowel sounds  EXT: Warm and well perfused.  No peripheral edema noted  Musculoskeletal: Moving all extremities with normal ROM, no joint swelling  PV: Pedal pulses palpable  Incision Sites: Dressed, clean and intact.  CT site clean and tube is patent.     LABS:      PT/INR - ( 10 Joaqumi 2024 12:18 )   PT: 11.3 sec;   INR: 0.99          PTT - ( 10 Joaquim 2024 12:18 )  PTT:29.0 sec    01-10    137  |  104  |  19  ----------------------------<  158<H>  4.2   |  24  |  1.02    Ca    8.5      10 Joaquim 2024 12:18        Urinalysis Basic - ( 10 Joaquim 2024 12:18 )    Color: x / Appearance: x / SG: x / pH: x  Gluc: 158 mg/dL / Ketone: x  / Bili: x / Urobili: x   Blood: x / Protein: x / Nitrite: x   Leuk Esterase: x / RBC: x / WBC x   Sq Epi: x / Non Sq Epi: x / Bacteria: x        MEDICATIONS  (STANDING):  acetaminophen     Tablet .. 1000 milliGRAM(s) Oral every 6 hours  ceFAZolin   IVPB 2000 milliGRAM(s) IV Intermittent every 8 hours  donepezil 5 milliGRAM(s) Oral at bedtime  enoxaparin Injectable 40 milliGRAM(s) SubCutaneous every 24 hours  famotidine    Tablet 20 milliGRAM(s) Oral every 12 hours  ibuprofen  Tablet. 400 milliGRAM(s) Oral every 6 hours  lactated ringers. 1000 milliLiter(s) (50 mL/Hr) IV Continuous <Continuous>  lidocaine   4% Patch 1 Patch Transdermal every 24 hours  metoprolol tartrate 25 milliGRAM(s) Oral every 12 hours  oxybutynin 5 milliGRAM(s) Oral two times a day    MEDICATIONS  (PRN):  HYDROmorphone  Injectable 0.25 milliGRAM(s) IV Push every 3 hours PRN Severe Pain (7 - 10)  ipratropium    for Nebulization 500 MICROGram(s) Nebulizer every 6 hours PRN Shortness of Breath and/or Wheezing  oxyCODONE    IR 5 milliGRAM(s) Oral every 4 hours PRN Moderate Pain (4 - 6)        RADIOLOGY & ADDITIONAL TESTS:     Operation / Date:  1/10/24 RA, RVATS, right upper lobe wedge, completion right upper lobectomy, MLND    SUBJECTIVE ASSESSMENT:  Patient seen in PACU.  He is c/o pain, getting PRNs.  Otherwise Denies CP/SOB/N/V/D/dizziness/cough/fever/chills.        Vital Signs Last 24 Hrs  T(C): 36.1 (10 Ojaquim 2024 12:08), Max: 36.3 (10 Joaquim 2024 07:14)  T(F): 97 (10 Joaquim 2024 12:08), Max: 97 (10 Joaquim 2024 12:08)  HR: 68 (10 Joaquim 2024 12:23) (62 - 69)  BP: 125/63 (10 Joaquim 2024 12:23) (125/59 - 152/73)  BP(mean): 89 (10 Joaquim 2024 12:23) (85 - 94)  RR: 19 (10 Joaquim 2024 12:23) (17 - 20)  SpO2: 96% (10 Joaquim 2024 12:23) (96% - 99%)    Parameters below as of 10 Joaquim 2024 12:23  Patient On (Oxygen Delivery Method): nasal cannula  O2 Flow (L/min): 3    I&O's Detail      CHEST TUBE:  Yes; AIR LEAKS: Yes. Suction   DIMAS: No.    PHYSICAL EXAM:    GEN: NAD, appropriately groggy  Psych: Mood appropriate  Neuro: A&Ox3.  No focal deficits.  Moving all extremities.   HEENT: No obvious abnormalities  CV: S1S2, regular, no murmurs appreciated.  No carotid bruits.  No JVD  Lungs: Clear B/L.  No wheezing, rales or rhonchi  ABD: Soft, non-tender, non-distended.  +Bowel sounds  EXT: Warm and well perfused.  No peripheral edema noted  Musculoskeletal: Moving all extremities with normal ROM, no joint swelling  PV: Pedal pulses palpable  Incision Sites: Dressed, clean and intact.  CT site clean and tube is patent.     LABS:      PT/INR - ( 10 Joaquim 2024 12:18 )   PT: 11.3 sec;   INR: 0.99          PTT - ( 10 Joaquim 2024 12:18 )  PTT:29.0 sec    01-10    137  |  104  |  19  ----------------------------<  158<H>  4.2   |  24  |  1.02    Ca    8.5      10 Joaquim 2024 12:18        Urinalysis Basic - ( 10 Joaquim 2024 12:18 )    Color: x / Appearance: x / SG: x / pH: x  Gluc: 158 mg/dL / Ketone: x  / Bili: x / Urobili: x   Blood: x / Protein: x / Nitrite: x   Leuk Esterase: x / RBC: x / WBC x   Sq Epi: x / Non Sq Epi: x / Bacteria: x        MEDICATIONS  (STANDING):  acetaminophen     Tablet .. 1000 milliGRAM(s) Oral every 6 hours  ceFAZolin   IVPB 2000 milliGRAM(s) IV Intermittent every 8 hours  donepezil 5 milliGRAM(s) Oral at bedtime  enoxaparin Injectable 40 milliGRAM(s) SubCutaneous every 24 hours  famotidine    Tablet 20 milliGRAM(s) Oral every 12 hours  ibuprofen  Tablet. 400 milliGRAM(s) Oral every 6 hours  lactated ringers. 1000 milliLiter(s) (50 mL/Hr) IV Continuous <Continuous>  lidocaine   4% Patch 1 Patch Transdermal every 24 hours  metoprolol tartrate 25 milliGRAM(s) Oral every 12 hours  oxybutynin 5 milliGRAM(s) Oral two times a day    MEDICATIONS  (PRN):  HYDROmorphone  Injectable 0.25 milliGRAM(s) IV Push every 3 hours PRN Severe Pain (7 - 10)  ipratropium    for Nebulization 500 MICROGram(s) Nebulizer every 6 hours PRN Shortness of Breath and/or Wheezing  oxyCODONE    IR 5 milliGRAM(s) Oral every 4 hours PRN Moderate Pain (4 - 6)        RADIOLOGY & ADDITIONAL TESTS:

## 2024-01-10 NOTE — PRE-ANESTHESIA EVALUATION ADULT - NSANTHOSAYNRD_GEN_A_CORE
No. MICHELA screening performed.  STOP BANG Legend: 0-2 = LOW Risk; 3-4 = INTERMEDIATE Risk; 5-8 = HIGH Risk

## 2024-01-10 NOTE — PRE-ANESTHESIA EVALUATION ADULT - NSANTHPMHFT_GEN_ALL_CORE
73-year-old M, former smoker (quit 16 years ago, smoked for 30 yers 1.5 ppd), with PMHx of prostate cancer s/p prostatectomy, copd, and urinary frequency. Who is referred by DR. MARY FERNANDEZ for an initial evaluation of pulmonary nodule. Patient reports some sob on exertion, he is otherwise in his usual state of health. Patient denies, hemoptysis, weight change, nausea, vomiting, diarrhea, chest pain, fever, or any recent illnesses.

## 2024-01-10 NOTE — PROGRESS NOTE ADULT - ASSESSMENT
This is a 72 y/o male, former smoker (quit 16 years ago, 45 pack year history), with PMHx of prostate cancer s/p prostatectomy, COPD, and urinary frequency.  Who is referred by DR. MARY FERNANDEZ for an initial evaluation of pulmonary nodule.  He was deemed a good candidate for surgery and is now s/p right VATS, RUL wedge resection with completion lobectomy for +CA.  He is recovering in PACU, pain being treated.  Coming to [facility] pending bed.    Neuro:  Pain control w/ Oxycodone and Dilaudid PRN  Tylenol standing  No focal deficits  Home Donezapil    CV:  HR/BP stable  H/o HTN and HLD.   BB for post op HR/BP control and to prevent post op afib.  Takes Norvasc at home, hold for now    Pulm:  CXR looks stable, reading states tiny right apical pneumothorax.   O2 as needed  CT noted to have air leak. Keep to suction for now.    Morning Xray tomorrow    GI:  NPO  Advance to diet once passes dysphagia screen    :  Trend BUN/creat and UOP  Creat 1.0 post op  Home Ditropan    ID:  No issues    Heme:  CBC pending    Dispo:  CT management This is a 74 y/o male, former smoker (quit 16 years ago, 45 pack year history), with PMHx of prostate cancer s/p prostatectomy, COPD, and urinary frequency.  Who is referred by DR. MARY FERNANDEZ for an initial evaluation of pulmonary nodule.  He was deemed a good candidate for surgery and is now s/p right VATS, RUL wedge resection with completion lobectomy for +CA.  He is recovering in PACU, pain being treated.  Coming to [facility] pending bed.    Neuro:  Pain control w/ Oxycodone and Dilaudid PRN  Tylenol standing  No focal deficits  Home Donezapil    CV:  HR/BP stable  H/o HTN and HLD.   BB for post op HR/BP control and to prevent post op afib.  Takes Norvasc at home, hold for now    Pulm:  CXR looks stable, reading states tiny right apical pneumothorax.   O2 as needed  CT noted to have air leak. Keep to suction for now.    Morning Xray tomorrow    GI:  NPO  Advance to diet once passes dysphagia screen    :  Trend BUN/creat and UOP  Creat 1.0 post op  Home Ditropan    ID:  No issues    Heme:  CBC pending    Dispo:  CT management This is a 74 y/o male, former smoker (quit 16 years ago, 45 pack year history), with PMHx of prostate cancer s/p prostatectomy, COPD, and urinary frequency.  Who is referred by DR. MARY FERNANDEZ for an initial evaluation of pulmonary nodule.  He was deemed a good candidate for surgery and is now s/p right VATS, RUL wedge resection with completion lobectomy for +CA.  He is recovering in PACU, pain being treated.  Coming to [facility] pending bed.    Neuro:  Pain control w/ Oxycodone and Dilaudid PRN  Tylenol standing  No focal deficits  Home Donezapil    CV:  HR/BP stable  H/o HTN and HLD.   BB for post op HR/BP control and to prevent post op afib.  Takes Norvasc at home, hold for now    Pulm:  CXR looks stable, reading states tiny right apical pneumothorax.   O2 as needed  CT noted to have air leak. Keep to suction for now.    Morning Xray tomorrow  Nebs for COPD    GI:  NPO  Advance to diet once passes dysphagia screen    :  Trend BUN/creat and UOP  Creat 1.0 post op  Home Ditropan    ID:  No issues    Heme:  CBC pending    Dispo:  CT management This is a 74 y/o male, former smoker (quit 16 years ago, 45 pack year history), with PMHx of prostate cancer s/p prostatectomy, COPD, and urinary frequency.  Who is referred by DR. MARY FERNANDEZ for an initial evaluation of pulmonary nodule.  He was deemed a good candidate for surgery and is now s/p right VATS, RUL wedge resection with completion lobectomy for +CA.  He is recovering in PACU, pain being treated.  Coming to [facility] pending bed.    Neuro:  Pain control w/ Oxycodone and Dilaudid PRN  Tylenol standing  No focal deficits  Home Donezapil    CV:  HR/BP stable  H/o HTN and HLD.   BB for post op HR/BP control and to prevent post op afib.  Takes Norvasc at home, hold for now    Pulm:  CXR looks stable, reading states tiny right apical pneumothorax.   O2 as needed  CT noted to have air leak. Keep to suction for now.    Morning Xray tomorrow  Nebs for COPD    GI:  NPO  Advance to diet once passes dysphagia screen    :  Trend BUN/creat and UOP  Creat 1.0 post op  Home Ditropan    ID:  No issues    Heme:  CBC pending    Dispo:  CT management

## 2024-01-10 NOTE — PRE-ANESTHESIA EVALUATION ADULT - NSANTHPEFT_GEN_ALL_CORE
A&Ox3  neck: no JVD,bruit  lung: No rhonchi, wheeze, rales  cor: RRR  extr: no c,c,e. Radial/Kurtis ok

## 2024-01-10 NOTE — BRIEF OPERATIVE NOTE - COMMENTS
Dr. STARK was the first assistant for this case including but not limited to  dissection, lobectomy    I was present for this procedure and participated as first assistant as described by the PA above, unless otherwise noted below.

## 2024-01-10 NOTE — BRIEF OPERATIVE NOTE - NSICDXBRIEFPROCEDURE_GEN_ALL_CORE_FT
PROCEDURES:  Lobectomy, lung, upper lobe, right, robot-assisted 10-Joaquim-2024 12:16:36  Krysta Garcia

## 2024-01-11 ENCOUNTER — TRANSCRIPTION ENCOUNTER (OUTPATIENT)
Age: 74
End: 2024-01-11

## 2024-01-11 LAB
A1C WITH ESTIMATED AVERAGE GLUCOSE RESULT: 6 % — HIGH (ref 4–5.6)
A1C WITH ESTIMATED AVERAGE GLUCOSE RESULT: 6 % — HIGH (ref 4–5.6)
ANION GAP SERPL CALC-SCNC: 10 MMOL/L — SIGNIFICANT CHANGE UP (ref 5–17)
ANION GAP SERPL CALC-SCNC: 10 MMOL/L — SIGNIFICANT CHANGE UP (ref 5–17)
ANION GAP SERPL CALC-SCNC: 11 MMOL/L — SIGNIFICANT CHANGE UP (ref 5–17)
ANION GAP SERPL CALC-SCNC: 11 MMOL/L — SIGNIFICANT CHANGE UP (ref 5–17)
BASOPHILS # BLD AUTO: 0.01 K/UL — SIGNIFICANT CHANGE UP (ref 0–0.2)
BASOPHILS # BLD AUTO: 0.01 K/UL — SIGNIFICANT CHANGE UP (ref 0–0.2)
BASOPHILS NFR BLD AUTO: 0.1 % — SIGNIFICANT CHANGE UP (ref 0–2)
BASOPHILS NFR BLD AUTO: 0.1 % — SIGNIFICANT CHANGE UP (ref 0–2)
BUN SERPL-MCNC: 26 MG/DL — HIGH (ref 7–23)
BUN SERPL-MCNC: 26 MG/DL — HIGH (ref 7–23)
BUN SERPL-MCNC: 30 MG/DL — HIGH (ref 7–23)
BUN SERPL-MCNC: 30 MG/DL — HIGH (ref 7–23)
CALCIUM SERPL-MCNC: 9.2 MG/DL — SIGNIFICANT CHANGE UP (ref 8.4–10.5)
CALCIUM SERPL-MCNC: 9.2 MG/DL — SIGNIFICANT CHANGE UP (ref 8.4–10.5)
CALCIUM SERPL-MCNC: 9.8 MG/DL — SIGNIFICANT CHANGE UP (ref 8.4–10.5)
CALCIUM SERPL-MCNC: 9.8 MG/DL — SIGNIFICANT CHANGE UP (ref 8.4–10.5)
CHLORIDE SERPL-SCNC: 102 MMOL/L — SIGNIFICANT CHANGE UP (ref 96–108)
CHLORIDE SERPL-SCNC: 102 MMOL/L — SIGNIFICANT CHANGE UP (ref 96–108)
CHLORIDE SERPL-SCNC: 103 MMOL/L — SIGNIFICANT CHANGE UP (ref 96–108)
CHLORIDE SERPL-SCNC: 103 MMOL/L — SIGNIFICANT CHANGE UP (ref 96–108)
CO2 SERPL-SCNC: 24 MMOL/L — SIGNIFICANT CHANGE UP (ref 22–31)
CO2 SERPL-SCNC: 24 MMOL/L — SIGNIFICANT CHANGE UP (ref 22–31)
CO2 SERPL-SCNC: 27 MMOL/L — SIGNIFICANT CHANGE UP (ref 22–31)
CO2 SERPL-SCNC: 27 MMOL/L — SIGNIFICANT CHANGE UP (ref 22–31)
CREAT SERPL-MCNC: 1.24 MG/DL — SIGNIFICANT CHANGE UP (ref 0.5–1.3)
CREAT SERPL-MCNC: 1.24 MG/DL — SIGNIFICANT CHANGE UP (ref 0.5–1.3)
CREAT SERPL-MCNC: 1.46 MG/DL — HIGH (ref 0.5–1.3)
CREAT SERPL-MCNC: 1.46 MG/DL — HIGH (ref 0.5–1.3)
EGFR: 50 ML/MIN/1.73M2 — LOW
EGFR: 50 ML/MIN/1.73M2 — LOW
EGFR: 61 ML/MIN/1.73M2 — SIGNIFICANT CHANGE UP
EGFR: 61 ML/MIN/1.73M2 — SIGNIFICANT CHANGE UP
EOSINOPHIL # BLD AUTO: 0 K/UL — SIGNIFICANT CHANGE UP (ref 0–0.5)
EOSINOPHIL # BLD AUTO: 0 K/UL — SIGNIFICANT CHANGE UP (ref 0–0.5)
EOSINOPHIL NFR BLD AUTO: 0 % — SIGNIFICANT CHANGE UP (ref 0–6)
EOSINOPHIL NFR BLD AUTO: 0 % — SIGNIFICANT CHANGE UP (ref 0–6)
ESTIMATED AVERAGE GLUCOSE: 126 MG/DL — HIGH (ref 68–114)
ESTIMATED AVERAGE GLUCOSE: 126 MG/DL — HIGH (ref 68–114)
GLUCOSE BLDC GLUCOMTR-MCNC: 99 MG/DL — SIGNIFICANT CHANGE UP (ref 70–99)
GLUCOSE BLDC GLUCOMTR-MCNC: 99 MG/DL — SIGNIFICANT CHANGE UP (ref 70–99)
GLUCOSE SERPL-MCNC: 124 MG/DL — HIGH (ref 70–99)
GLUCOSE SERPL-MCNC: 124 MG/DL — HIGH (ref 70–99)
GLUCOSE SERPL-MCNC: 126 MG/DL — HIGH (ref 70–99)
GLUCOSE SERPL-MCNC: 126 MG/DL — HIGH (ref 70–99)
HCT VFR BLD CALC: 38.2 % — LOW (ref 39–50)
HCT VFR BLD CALC: 38.2 % — LOW (ref 39–50)
HCV AB S/CO SERPL IA: 0.04 S/CO — SIGNIFICANT CHANGE UP
HCV AB S/CO SERPL IA: 0.04 S/CO — SIGNIFICANT CHANGE UP
HCV AB SERPL-IMP: SIGNIFICANT CHANGE UP
HCV AB SERPL-IMP: SIGNIFICANT CHANGE UP
HGB BLD-MCNC: 12.5 G/DL — LOW (ref 13–17)
HGB BLD-MCNC: 12.5 G/DL — LOW (ref 13–17)
IMM GRANULOCYTES NFR BLD AUTO: 0.8 % — SIGNIFICANT CHANGE UP (ref 0–0.9)
IMM GRANULOCYTES NFR BLD AUTO: 0.8 % — SIGNIFICANT CHANGE UP (ref 0–0.9)
LYMPHOCYTES # BLD AUTO: 0.76 K/UL — LOW (ref 1–3.3)
LYMPHOCYTES # BLD AUTO: 0.76 K/UL — LOW (ref 1–3.3)
LYMPHOCYTES # BLD AUTO: 5.6 % — LOW (ref 13–44)
LYMPHOCYTES # BLD AUTO: 5.6 % — LOW (ref 13–44)
MAGNESIUM SERPL-MCNC: 2 MG/DL — SIGNIFICANT CHANGE UP (ref 1.6–2.6)
MAGNESIUM SERPL-MCNC: 2 MG/DL — SIGNIFICANT CHANGE UP (ref 1.6–2.6)
MCHC RBC-ENTMCNC: 30 PG — SIGNIFICANT CHANGE UP (ref 27–34)
MCHC RBC-ENTMCNC: 30 PG — SIGNIFICANT CHANGE UP (ref 27–34)
MCHC RBC-ENTMCNC: 32.7 GM/DL — SIGNIFICANT CHANGE UP (ref 32–36)
MCHC RBC-ENTMCNC: 32.7 GM/DL — SIGNIFICANT CHANGE UP (ref 32–36)
MCV RBC AUTO: 91.6 FL — SIGNIFICANT CHANGE UP (ref 80–100)
MCV RBC AUTO: 91.6 FL — SIGNIFICANT CHANGE UP (ref 80–100)
MONOCYTES # BLD AUTO: 0.79 K/UL — SIGNIFICANT CHANGE UP (ref 0–0.9)
MONOCYTES # BLD AUTO: 0.79 K/UL — SIGNIFICANT CHANGE UP (ref 0–0.9)
MONOCYTES NFR BLD AUTO: 5.9 % — SIGNIFICANT CHANGE UP (ref 2–14)
MONOCYTES NFR BLD AUTO: 5.9 % — SIGNIFICANT CHANGE UP (ref 2–14)
NEUTROPHILS # BLD AUTO: 11.81 K/UL — HIGH (ref 1.8–7.4)
NEUTROPHILS # BLD AUTO: 11.81 K/UL — HIGH (ref 1.8–7.4)
NEUTROPHILS NFR BLD AUTO: 87.6 % — HIGH (ref 43–77)
NEUTROPHILS NFR BLD AUTO: 87.6 % — HIGH (ref 43–77)
NRBC # BLD: 0 /100 WBCS — SIGNIFICANT CHANGE UP (ref 0–0)
NRBC # BLD: 0 /100 WBCS — SIGNIFICANT CHANGE UP (ref 0–0)
PLATELET # BLD AUTO: 199 K/UL — SIGNIFICANT CHANGE UP (ref 150–400)
PLATELET # BLD AUTO: 199 K/UL — SIGNIFICANT CHANGE UP (ref 150–400)
POTASSIUM SERPL-MCNC: 5 MMOL/L — SIGNIFICANT CHANGE UP (ref 3.5–5.3)
POTASSIUM SERPL-MCNC: 5 MMOL/L — SIGNIFICANT CHANGE UP (ref 3.5–5.3)
POTASSIUM SERPL-MCNC: 5.2 MMOL/L — SIGNIFICANT CHANGE UP (ref 3.5–5.3)
POTASSIUM SERPL-MCNC: 5.2 MMOL/L — SIGNIFICANT CHANGE UP (ref 3.5–5.3)
POTASSIUM SERPL-SCNC: 5 MMOL/L — SIGNIFICANT CHANGE UP (ref 3.5–5.3)
POTASSIUM SERPL-SCNC: 5 MMOL/L — SIGNIFICANT CHANGE UP (ref 3.5–5.3)
POTASSIUM SERPL-SCNC: 5.2 MMOL/L — SIGNIFICANT CHANGE UP (ref 3.5–5.3)
POTASSIUM SERPL-SCNC: 5.2 MMOL/L — SIGNIFICANT CHANGE UP (ref 3.5–5.3)
RBC # BLD: 4.17 M/UL — LOW (ref 4.2–5.8)
RBC # BLD: 4.17 M/UL — LOW (ref 4.2–5.8)
RBC # FLD: 13.7 % — SIGNIFICANT CHANGE UP (ref 10.3–14.5)
RBC # FLD: 13.7 % — SIGNIFICANT CHANGE UP (ref 10.3–14.5)
SODIUM SERPL-SCNC: 137 MMOL/L — SIGNIFICANT CHANGE UP (ref 135–145)
SODIUM SERPL-SCNC: 137 MMOL/L — SIGNIFICANT CHANGE UP (ref 135–145)
SODIUM SERPL-SCNC: 140 MMOL/L — SIGNIFICANT CHANGE UP (ref 135–145)
SODIUM SERPL-SCNC: 140 MMOL/L — SIGNIFICANT CHANGE UP (ref 135–145)
TSH SERPL-MCNC: 0.32 UIU/ML — SIGNIFICANT CHANGE UP (ref 0.27–4.2)
TSH SERPL-MCNC: 0.32 UIU/ML — SIGNIFICANT CHANGE UP (ref 0.27–4.2)
WBC # BLD: 13.48 K/UL — HIGH (ref 3.8–10.5)
WBC # BLD: 13.48 K/UL — HIGH (ref 3.8–10.5)
WBC # FLD AUTO: 13.48 K/UL — HIGH (ref 3.8–10.5)
WBC # FLD AUTO: 13.48 K/UL — HIGH (ref 3.8–10.5)

## 2024-01-11 PROCEDURE — 71045 X-RAY EXAM CHEST 1 VIEW: CPT | Mod: 26,76

## 2024-01-11 RX ORDER — ATORVASTATIN CALCIUM 80 MG/1
10 TABLET, FILM COATED ORAL AT BEDTIME
Refills: 0 | Status: DISCONTINUED | OUTPATIENT
Start: 2024-01-11 | End: 2024-01-17

## 2024-01-11 RX ORDER — FUROSEMIDE 40 MG
20 TABLET ORAL ONCE
Refills: 0 | Status: COMPLETED | OUTPATIENT
Start: 2024-01-11 | End: 2024-01-11

## 2024-01-11 RX ORDER — POTASSIUM CHLORIDE 20 MEQ
20 PACKET (EA) ORAL ONCE
Refills: 0 | Status: COMPLETED | OUTPATIENT
Start: 2024-01-11 | End: 2024-01-11

## 2024-01-11 RX ORDER — AMLODIPINE BESYLATE 2.5 MG/1
5 TABLET ORAL DAILY
Refills: 0 | Status: DISCONTINUED | OUTPATIENT
Start: 2024-01-11 | End: 2024-01-17

## 2024-01-11 RX ORDER — POLYETHYLENE GLYCOL 3350 17 G/17G
17 POWDER, FOR SOLUTION ORAL DAILY
Refills: 0 | Status: DISCONTINUED | OUTPATIENT
Start: 2024-01-11 | End: 2024-01-17

## 2024-01-11 RX ADMIN — Medication 1000 MILLIGRAM(S): at 07:21

## 2024-01-11 RX ADMIN — Medication 20 MILLIGRAM(S): at 12:53

## 2024-01-11 RX ADMIN — Medication 1000 MILLIGRAM(S): at 12:30

## 2024-01-11 RX ADMIN — Medication 400 MILLIGRAM(S): at 06:39

## 2024-01-11 RX ADMIN — Medication 400 MILLIGRAM(S): at 17:48

## 2024-01-11 RX ADMIN — Medication 5 MILLIGRAM(S): at 17:48

## 2024-01-11 RX ADMIN — Medication 400 MILLIGRAM(S): at 07:21

## 2024-01-11 RX ADMIN — Medication 500 MICROGRAM(S): at 03:44

## 2024-01-11 RX ADMIN — Medication 1000 MILLIGRAM(S): at 23:29

## 2024-01-11 RX ADMIN — Medication 20 MILLIEQUIVALENT(S): at 12:53

## 2024-01-11 RX ADMIN — Medication 100 MILLIGRAM(S): at 07:17

## 2024-01-11 RX ADMIN — FAMOTIDINE 20 MILLIGRAM(S): 10 INJECTION INTRAVENOUS at 06:39

## 2024-01-11 RX ADMIN — Medication 5 MILLIGRAM(S): at 06:39

## 2024-01-11 RX ADMIN — ENOXAPARIN SODIUM 40 MILLIGRAM(S): 100 INJECTION SUBCUTANEOUS at 17:47

## 2024-01-11 RX ADMIN — POLYETHYLENE GLYCOL 3350 17 GRAM(S): 17 POWDER, FOR SOLUTION ORAL at 17:47

## 2024-01-11 RX ADMIN — Medication 25 MILLIGRAM(S): at 06:39

## 2024-01-11 RX ADMIN — Medication 400 MILLIGRAM(S): at 23:59

## 2024-01-11 RX ADMIN — Medication 400 MILLIGRAM(S): at 23:28

## 2024-01-11 RX ADMIN — Medication 1000 MILLIGRAM(S): at 23:59

## 2024-01-11 RX ADMIN — FAMOTIDINE 20 MILLIGRAM(S): 10 INJECTION INTRAVENOUS at 17:48

## 2024-01-11 RX ADMIN — Medication 1000 MILLIGRAM(S): at 06:39

## 2024-01-11 RX ADMIN — Medication 400 MILLIGRAM(S): at 11:43

## 2024-01-11 RX ADMIN — LIDOCAINE 1 PATCH: 4 CREAM TOPICAL at 18:05

## 2024-01-11 RX ADMIN — Medication 1000 MILLIGRAM(S): at 18:35

## 2024-01-11 RX ADMIN — ATORVASTATIN CALCIUM 10 MILLIGRAM(S): 80 TABLET, FILM COATED ORAL at 23:30

## 2024-01-11 RX ADMIN — AMLODIPINE BESYLATE 5 MILLIGRAM(S): 2.5 TABLET ORAL at 17:47

## 2024-01-11 RX ADMIN — LIDOCAINE 1 PATCH: 4 CREAM TOPICAL at 07:21

## 2024-01-11 RX ADMIN — Medication 1000 MILLIGRAM(S): at 11:43

## 2024-01-11 RX ADMIN — Medication 1000 MILLIGRAM(S): at 17:48

## 2024-01-11 RX ADMIN — Medication 400 MILLIGRAM(S): at 18:35

## 2024-01-11 RX ADMIN — LIDOCAINE 1 PATCH: 4 CREAM TOPICAL at 19:56

## 2024-01-11 RX ADMIN — DONEPEZIL HYDROCHLORIDE 5 MILLIGRAM(S): 10 TABLET, FILM COATED ORAL at 22:20

## 2024-01-11 RX ADMIN — SENNA PLUS 2 TABLET(S): 8.6 TABLET ORAL at 22:20

## 2024-01-11 RX ADMIN — Medication 400 MILLIGRAM(S): at 12:30

## 2024-01-11 NOTE — PROGRESS NOTE ADULT - SUBJECTIVE AND OBJECTIVE BOX
Patient discussed on morning rounds with Dr. Navas    OPERATION & DATE: 1/10 R VATS, RA RUL wedge, completion RULx, MLND    SUBJECTIVE ASSESSMENT:  Assessed at bedside this morning. No acute complaints, feels generally well, minimal pain. Denies SOB, is using his IS and pulling 2000 cc. No BM yet, endorses flatus, denies abdominal pain. Denies fever, chills, nausea, vomiting.     VITAL SIGNS:  Vital Signs Last 24 Hrs  T(C): 36.3 (11 Jan 2024 09:24), Max: 36.9 (11 Jan 2024 05:21)  T(F): 97.3 (11 Jan 2024 09:24), Max: 98.5 (11 Jan 2024 05:21)  HR: 60 (11 Jan 2024 09:16) (56 - 83)  BP: 150/70 (11 Jan 2024 09:16) (124/62 - 159/72)  BP(mean): 100 (11 Jan 2024 09:16) (82 - 108)  RR: 17 (11 Jan 2024 09:16) (15 - 18)  SpO2: 96% (11 Jan 2024 09:16) (92% - 99%)    Parameters below as of 11 Jan 2024 09:16  Patient On (Oxygen Delivery Method): room air      I&O's Detail    10 Joaquim 2024 07:01  -  11 Jan 2024 07:00  --------------------------------------------------------  IN:    IV PiggyBack: 100 mL    Lactated Ringers: 400 mL  Total IN: 500 mL    OUT:    Chest Tube (mL): 300 mL    Voided (mL): 2125 mL  Total OUT: 2425 mL    Total NET: -1925 mL      11 Jan 2024 07:01  -  11 Jan 2024 13:42  --------------------------------------------------------  IN:    IV PiggyBack: 100 mL  Total IN: 100 mL    OUT:    Chest Tube (mL): 60 mL    Voided (mL): 250 mL  Total OUT: 310 mL    Total NET: -210 mL    CHEST TUBE:  Yes X1, on water seal with intermittent air leak  PATSY DRAIN: No  EPICARDIAL WIRES: No   STITCHES: Yes  STAPLES: No  DIMAS:  No  CENTRAL LINE: No  MIDLINE/PICC: No  WOUND VAC: No    Physical Exam  CONSTITUTIONAL: Well appearing in NAD assessed laying comfortably in bed   NEURO: A&OX3. No focal deficits noted, moving bilateral upper and lower extremities                    CV: RRR, no murmurs, rubs, gallops  RESPIRATORY: Clear to auscultation bilateral posterior lung fields, no wheezes, rales, rhonchi   GI: +BS, NT/ND  MUSKULOSKELETAL: No peripheral edema or calf tenderness. Full strength and ROM bilateral upper and lower extremities   VASCULAR: Bilateral distal pulses 2+  INCISIONS: R VATS incisions clean, dry, intact    LABS:                        12.5   13.48 )-----------( 199      ( 11 Jan 2024 05:30 )             38.2     PT/INR - ( 10 Joaquim 2024 12:18 )   PT: 11.3 sec;   INR: 0.99          PTT - ( 10 Joaquim 2024 12:18 )  PTT:29.0 sec  01-11    137  |  102  |  26<H>  ----------------------------<  124<H>  5.2   |  24  |  1.24    Ca    9.2      11 Jan 2024 05:30  Mg     2.0     01-11      Urinalysis Basic - ( 11 Jan 2024 05:30 )    Color: x / Appearance: x / SG: x / pH: x  Gluc: 124 mg/dL / Ketone: x  / Bili: x / Urobili: x   Blood: x / Protein: x / Nitrite: x   Leuk Esterase: x / RBC: x / WBC x   Sq Epi: x / Non Sq Epi: x / Bacteria: x      MEDICATIONS  (STANDING):  acetaminophen     Tablet .. 1000 milliGRAM(s) Oral every 6 hours  donepezil 5 milliGRAM(s) Oral at bedtime  enoxaparin Injectable 40 milliGRAM(s) SubCutaneous every 24 hours  famotidine    Tablet 20 milliGRAM(s) Oral every 12 hours  ibuprofen  Tablet. 400 milliGRAM(s) Oral every 6 hours  lactated ringers. 1000 milliLiter(s) (50 mL/Hr) IV Continuous <Continuous>  lidocaine   4% Patch 1 Patch Transdermal every 24 hours  metoprolol tartrate 25 milliGRAM(s) Oral every 12 hours  oxybutynin 5 milliGRAM(s) Oral two times a day  senna 2 Tablet(s) Oral at bedtime    MEDICATIONS  (PRN):  HYDROmorphone  Injectable 0.25 milliGRAM(s) IV Push every 3 hours PRN Severe Pain (7 - 10)  ipratropium    for Nebulization 500 MICROGram(s) Nebulizer every 6 hours PRN Shortness of Breath and/or Wheezing  oxyCODONE    IR 5 milliGRAM(s) Oral every 4 hours PRN Moderate Pain (4 - 6)    RADIOLOGY & ADDITIONAL TESTS:    < from: Xray Chest 1 View-PORTABLE IMMEDIATE (Xray Chest 1 View-PORTABLE IMMEDIATE .) (01.11.24 @ 11:26) >  IMPRESSION:    Similar appearance to prior exam earliersame day. Right chest tube. No   pneumothorax identified.    < end of copied text >

## 2024-01-11 NOTE — PROGRESS NOTE ADULT - ASSESSMENT
A/P:    This is a 72 y/o male, former smoker (quit 16 years ago, 45 pack year history), with PMHx of prostate cancer s/p prostatectomy, COPD, and urinary frequency.  Referred to Dr. Navas for evaluation of pulmonary nodule by Dr. Britta Camacho. Deemed a good candidate for intervention. On 1/10 he underwent an uncomplicated R VATS, RA RUL wedge completion right upper lobectomy and mediastinal lymph node dissection. Post op he was brought to [facility] with 1 CT in place. POD1 CT placed to water seal with stable CXR.     Neurovascular:   - No delirium. Pain well controlled with current regimen.  -Continue tylenol, ibuprofen, and dilaudid PRN  - Continue home donepezil 10 mg daily     Cardiovascular:   -Hemodynamically stable. HR controlled (X-X)  - Hx of HTN, BP controlled (X-X)  - ASA, Plavix, BB, statin  - EKG. TTE. Cardiac Panel. Lipid Panel. BNP.      Respiratory:   - 02 Sat = 98% on RA.  -If on oxygen wean to RA from for O2 Sat > 93%.  -Encourage C+DB and Use of IS 10x / hr while awake.  -CXR.    GI:   - Stable.  -NPO after MN.  -Continue GI PPX with protonix  -PO Diet.    Renal / :  - BUN/Cr stable at X/X  -Continue to monitor renal function.  -Monitor I/O's.  - Replete electrolytes PRN    Endocrine:    -A1c.  -TSH.    Hematologic:  -H/H stable at X/X with no obvious signs of bleeding  -Coagulation Panel.    ID:  -Pt remains afebrile with no elevation in WBC or signs of infection  -Continue to monitor CBC  -Observe for SIRS/Sepsis Syndrome.    Prophylaxis:  -DVT prophylaxis with 5000 SubQ Heparin q8h.  -SCD's    Disposition:  -Home when medically appropriate.   A/P:    This is a 72 y/o male, former smoker (quit 16 years ago, 45 pack year history), with PMHx of prostate cancer s/p prostatectomy, COPD, and urinary frequency.  Referred to Dr. Navas for evaluation of pulmonary nodule by Dr. Britta Camacho. Deemed a good candidate for intervention. On 1/10 he underwent an uncomplicated R VATS, RA RUL wedge completion right upper lobectomy and mediastinal lymph node dissection. Post op he was brought to [facility] with 1 CT in place. POD1 CT placed to water seal with stable CXR.     Neurovascular:   - No delirium. Pain well controlled with current regimen.  -Continue tylenol, ibuprofen, and dilaudid PRN  - Continue home donepezil 10 mg daily     Cardiovascular:   -Hemodynamically stable. HR controlled (X-X)  - Hx of HTN, BP controlled (X-X)  - ASA, Plavix, BB, statin  - EKG. TTE. Cardiac Panel. Lipid Panel. BNP.      Respiratory:   - 02 Sat = 98% on RA.  -If on oxygen wean to RA from for O2 Sat > 93%.  -Encourage C+DB and Use of IS 10x / hr while awake.  -CXR.    GI:   - Stable.  -NPO after MN.  -Continue GI PPX with protonix  -PO Diet.    Renal / :  - BUN/Cr stable at X/X  -Continue to monitor renal function.  -Monitor I/O's.  - Replete electrolytes PRN    Endocrine:    -A1c.  -TSH.    Hematologic:  -H/H stable at X/X with no obvious signs of bleeding  -Coagulation Panel.    ID:  -Pt remains afebrile with no elevation in WBC or signs of infection  -Continue to monitor CBC  -Observe for SIRS/Sepsis Syndrome.    Prophylaxis:  -DVT prophylaxis with 5000 SubQ Heparin q8h.  -SCD's    Disposition:  -Home when medically appropriate.   A/P:    This is a 74 y/o male, former smoker (quit 16 years ago, 45 pack year history), with PMHx of prostate cancer s/p prostatectomy, COPD, and urinary frequency.  Referred to Dr. Navas for evaluation of pulmonary nodule by Dr. Britta Camacho. Deemed a good candidate for intervention. On 1/10 he underwent an uncomplicated R VATS, RA RUL wedge completion right upper lobectomy and mediastinal lymph node dissection. Post op he was brought to [facility] with 1 CT in place. POD1 CT placed to water seal with stable CXR.     Neurovascular:   - No delirium. Pain well controlled with current regimen.  -Continue tylenol, ibuprofen, and dilaudid, oxy IR PRN  - Continue home donepezil 10 mg daily     Cardiovascular:   -Hemodynamically stable. HR controlled (56-83)  - Hx of HTN, BP controlled (125//85), continue home norvasc 5 mg daily  - Continue metoprolol 25 mg q12 hours for post op AF prevention  - HLD: continue atorvastatin 10 mg daily     Respiratory:   - POD1 s/p R VATS, RUL wedge, completion right upper lobectomy, MLND  - 1 CT in place, on suction, air leak with cough   - 02 Sat = 98% on RA.  -Encourage C+DB and Use of IS 10x / hr while awake.  -CXR without PTX or effusion     GI:   - Stable.  -Continue GI PPX with pepcid  -PO Diet.    Renal / :  - BUN/Cr stable at 26/1.24  - Given one dose lasix 20 IV today   -Continue to monitor renal function.  -Monitor I/O's.  - Replete electrolytes PRN  - Bladder spasms: continue oxybutynin     Endocrine:    -A1c pending, no known hx DM  -TSH pending, no known hx thyroid disease     Hematologic:  -H/H stable at 12.5/38.2 with no obvious signs of bleeding  -Coagulation Panel.    ID:  -Pt remains afebrile with no elevation in WBC or signs of infection  -Continue to monitor CBC  -Observe for SIRS/Sepsis Syndrome.    Prophylaxis:  -DVT prophylaxis with SQ lovenox   -SCD's    Disposition:  -Home when medically appropriate pending CT management   A/P:    This is a 74 y/o male, former smoker (quit 16 years ago, 45 pack year history), with PMHx of prostate cancer s/p prostatectomy, COPD, and urinary frequency.  Referred to Dr. Navas for evaluation of pulmonary nodule by Dr. Britta Camacho. Deemed a good candidate for intervention. On 1/10 he underwent an uncomplicated R VATS, RA RUL wedge completion right upper lobectomy and mediastinal lymph node dissection. Post op he was brought to [facility] with 1 CT in place. POD1 CT placed to water seal with stable CXR.     Neurovascular:   - No delirium. Pain well controlled with current regimen.  -Continue tylenol, ibuprofen, and dilaudid, oxy IR PRN  - Continue home donepezil 10 mg daily     Cardiovascular:   -Hemodynamically stable. HR controlled (56-83)  - Hx of HTN, BP controlled (125//85), continue home norvasc 5 mg daily  - Continue metoprolol 25 mg q12 hours for post op AF prevention  - HLD: continue atorvastatin 10 mg daily     Respiratory:   - POD1 s/p R VATS, RUL wedge, completion right upper lobectomy, MLND  - 1 CT in place, on suction, air leak with cough   - 02 Sat = 98% on RA.  -Encourage C+DB and Use of IS 10x / hr while awake.  -CXR without PTX or effusion     GI:   - Stable.  -Continue GI PPX with pepcid  -PO Diet.    Renal / :  - BUN/Cr stable at 26/1.24  - Given one dose lasix 20 IV today   -Continue to monitor renal function.  -Monitor I/O's.  - Replete electrolytes PRN  - Bladder spasms: continue oxybutynin     Endocrine:    -A1c pending, no known hx DM  -TSH pending, no known hx thyroid disease     Hematologic:  -H/H stable at 12.5/38.2 with no obvious signs of bleeding  -Coagulation Panel.    ID:  -Pt remains afebrile with no elevation in WBC or signs of infection  -Continue to monitor CBC  -Observe for SIRS/Sepsis Syndrome.    Prophylaxis:  -DVT prophylaxis with SQ lovenox   -SCD's    Disposition:  -Home when medically appropriate pending CT management

## 2024-01-12 LAB
ANION GAP SERPL CALC-SCNC: 8 MMOL/L — SIGNIFICANT CHANGE UP (ref 5–17)
ANION GAP SERPL CALC-SCNC: 8 MMOL/L — SIGNIFICANT CHANGE UP (ref 5–17)
APTT BLD: 25.9 SEC — SIGNIFICANT CHANGE UP (ref 24.5–35.6)
APTT BLD: 25.9 SEC — SIGNIFICANT CHANGE UP (ref 24.5–35.6)
BUN SERPL-MCNC: 29 MG/DL — HIGH (ref 7–23)
BUN SERPL-MCNC: 29 MG/DL — HIGH (ref 7–23)
CALCIUM SERPL-MCNC: 9.2 MG/DL — SIGNIFICANT CHANGE UP (ref 8.4–10.5)
CALCIUM SERPL-MCNC: 9.2 MG/DL — SIGNIFICANT CHANGE UP (ref 8.4–10.5)
CHLORIDE SERPL-SCNC: 105 MMOL/L — SIGNIFICANT CHANGE UP (ref 96–108)
CHLORIDE SERPL-SCNC: 105 MMOL/L — SIGNIFICANT CHANGE UP (ref 96–108)
CO2 SERPL-SCNC: 27 MMOL/L — SIGNIFICANT CHANGE UP (ref 22–31)
CO2 SERPL-SCNC: 27 MMOL/L — SIGNIFICANT CHANGE UP (ref 22–31)
CREAT SERPL-MCNC: 1.41 MG/DL — HIGH (ref 0.5–1.3)
CREAT SERPL-MCNC: 1.41 MG/DL — HIGH (ref 0.5–1.3)
EGFR: 52 ML/MIN/1.73M2 — LOW
EGFR: 52 ML/MIN/1.73M2 — LOW
GLUCOSE SERPL-MCNC: 100 MG/DL — HIGH (ref 70–99)
GLUCOSE SERPL-MCNC: 100 MG/DL — HIGH (ref 70–99)
HCT VFR BLD CALC: 37.8 % — LOW (ref 39–50)
HCT VFR BLD CALC: 37.8 % — LOW (ref 39–50)
HGB BLD-MCNC: 12.1 G/DL — LOW (ref 13–17)
HGB BLD-MCNC: 12.1 G/DL — LOW (ref 13–17)
INR BLD: 0.86 — SIGNIFICANT CHANGE UP (ref 0.85–1.18)
INR BLD: 0.86 — SIGNIFICANT CHANGE UP (ref 0.85–1.18)
MAGNESIUM SERPL-MCNC: 2.2 MG/DL — SIGNIFICANT CHANGE UP (ref 1.6–2.6)
MAGNESIUM SERPL-MCNC: 2.2 MG/DL — SIGNIFICANT CHANGE UP (ref 1.6–2.6)
MCHC RBC-ENTMCNC: 30.2 PG — SIGNIFICANT CHANGE UP (ref 27–34)
MCHC RBC-ENTMCNC: 30.2 PG — SIGNIFICANT CHANGE UP (ref 27–34)
MCHC RBC-ENTMCNC: 32 GM/DL — SIGNIFICANT CHANGE UP (ref 32–36)
MCHC RBC-ENTMCNC: 32 GM/DL — SIGNIFICANT CHANGE UP (ref 32–36)
MCV RBC AUTO: 94.3 FL — SIGNIFICANT CHANGE UP (ref 80–100)
MCV RBC AUTO: 94.3 FL — SIGNIFICANT CHANGE UP (ref 80–100)
NRBC # BLD: 0 /100 WBCS — SIGNIFICANT CHANGE UP (ref 0–0)
NRBC # BLD: 0 /100 WBCS — SIGNIFICANT CHANGE UP (ref 0–0)
PLATELET # BLD AUTO: 189 K/UL — SIGNIFICANT CHANGE UP (ref 150–400)
PLATELET # BLD AUTO: 189 K/UL — SIGNIFICANT CHANGE UP (ref 150–400)
POTASSIUM SERPL-MCNC: 4.8 MMOL/L — SIGNIFICANT CHANGE UP (ref 3.5–5.3)
POTASSIUM SERPL-MCNC: 4.8 MMOL/L — SIGNIFICANT CHANGE UP (ref 3.5–5.3)
POTASSIUM SERPL-SCNC: 4.8 MMOL/L — SIGNIFICANT CHANGE UP (ref 3.5–5.3)
POTASSIUM SERPL-SCNC: 4.8 MMOL/L — SIGNIFICANT CHANGE UP (ref 3.5–5.3)
PROTHROM AB SERPL-ACNC: 9.9 SEC — SIGNIFICANT CHANGE UP (ref 9.5–13)
PROTHROM AB SERPL-ACNC: 9.9 SEC — SIGNIFICANT CHANGE UP (ref 9.5–13)
RBC # BLD: 4.01 M/UL — LOW (ref 4.2–5.8)
RBC # BLD: 4.01 M/UL — LOW (ref 4.2–5.8)
RBC # FLD: 14.5 % — SIGNIFICANT CHANGE UP (ref 10.3–14.5)
RBC # FLD: 14.5 % — SIGNIFICANT CHANGE UP (ref 10.3–14.5)
SODIUM SERPL-SCNC: 140 MMOL/L — SIGNIFICANT CHANGE UP (ref 135–145)
SODIUM SERPL-SCNC: 140 MMOL/L — SIGNIFICANT CHANGE UP (ref 135–145)
WBC # BLD: 12.35 K/UL — HIGH (ref 3.8–10.5)
WBC # BLD: 12.35 K/UL — HIGH (ref 3.8–10.5)
WBC # FLD AUTO: 12.35 K/UL — HIGH (ref 3.8–10.5)
WBC # FLD AUTO: 12.35 K/UL — HIGH (ref 3.8–10.5)

## 2024-01-12 PROCEDURE — 71045 X-RAY EXAM CHEST 1 VIEW: CPT | Mod: 26

## 2024-01-12 PROCEDURE — 71045 X-RAY EXAM CHEST 1 VIEW: CPT | Mod: 26,77

## 2024-01-12 RX ORDER — ALBUTEROL 90 UG/1
2.5 AEROSOL, METERED ORAL ONCE
Refills: 0 | Status: COMPLETED | OUTPATIENT
Start: 2024-01-12 | End: 2024-01-12

## 2024-01-12 RX ADMIN — FAMOTIDINE 20 MILLIGRAM(S): 10 INJECTION INTRAVENOUS at 06:23

## 2024-01-12 RX ADMIN — ENOXAPARIN SODIUM 40 MILLIGRAM(S): 100 INJECTION SUBCUTANEOUS at 17:13

## 2024-01-12 RX ADMIN — Medication 1000 MILLIGRAM(S): at 06:54

## 2024-01-12 RX ADMIN — Medication 1000 MILLIGRAM(S): at 17:12

## 2024-01-12 RX ADMIN — LIDOCAINE 1 PATCH: 4 CREAM TOPICAL at 19:28

## 2024-01-12 RX ADMIN — Medication 1000 MILLIGRAM(S): at 06:24

## 2024-01-12 RX ADMIN — ATORVASTATIN CALCIUM 10 MILLIGRAM(S): 80 TABLET, FILM COATED ORAL at 21:23

## 2024-01-12 RX ADMIN — Medication 25 MILLIGRAM(S): at 17:13

## 2024-01-12 RX ADMIN — Medication 500 MICROGRAM(S): at 15:28

## 2024-01-12 RX ADMIN — Medication 1000 MILLIGRAM(S): at 23:14

## 2024-01-12 RX ADMIN — POLYETHYLENE GLYCOL 3350 17 GRAM(S): 17 POWDER, FOR SOLUTION ORAL at 12:06

## 2024-01-12 RX ADMIN — SENNA PLUS 2 TABLET(S): 8.6 TABLET ORAL at 21:23

## 2024-01-12 RX ADMIN — FAMOTIDINE 20 MILLIGRAM(S): 10 INJECTION INTRAVENOUS at 17:12

## 2024-01-12 RX ADMIN — AMLODIPINE BESYLATE 5 MILLIGRAM(S): 2.5 TABLET ORAL at 06:23

## 2024-01-12 RX ADMIN — Medication 1000 MILLIGRAM(S): at 17:30

## 2024-01-12 RX ADMIN — DONEPEZIL HYDROCHLORIDE 5 MILLIGRAM(S): 10 TABLET, FILM COATED ORAL at 21:23

## 2024-01-12 RX ADMIN — LIDOCAINE 1 PATCH: 4 CREAM TOPICAL at 06:05

## 2024-01-12 RX ADMIN — OXYCODONE HYDROCHLORIDE 5 MILLIGRAM(S): 5 TABLET ORAL at 17:59

## 2024-01-12 RX ADMIN — LIDOCAINE 1 PATCH: 4 CREAM TOPICAL at 16:01

## 2024-01-12 RX ADMIN — Medication 400 MILLIGRAM(S): at 06:54

## 2024-01-12 RX ADMIN — Medication 400 MILLIGRAM(S): at 06:24

## 2024-01-12 RX ADMIN — Medication 1000 MILLIGRAM(S): at 12:06

## 2024-01-12 RX ADMIN — ALBUTEROL 2.5 MILLIGRAM(S): 90 AEROSOL, METERED ORAL at 16:00

## 2024-01-12 RX ADMIN — Medication 1000 MILLIGRAM(S): at 23:34

## 2024-01-12 RX ADMIN — OXYCODONE HYDROCHLORIDE 5 MILLIGRAM(S): 5 TABLET ORAL at 17:13

## 2024-01-12 RX ADMIN — Medication 5 MILLIGRAM(S): at 06:23

## 2024-01-12 RX ADMIN — Medication 1000 MILLIGRAM(S): at 13:00

## 2024-01-12 RX ADMIN — Medication 5 MILLIGRAM(S): at 17:12

## 2024-01-12 NOTE — PROGRESS NOTE ADULT - ASSESSMENT
This is a 72 y/o male, former smoker (quit 16 years ago, 45 pack year history), with PMHx of prostate cancer s/p prostatectomy, COPD, and urinary frequency.  Referred to Dr. Navas for evaluation of pulmonary nodule by Dr. Britta Camacho. Deemed a good candidate for intervention. On 1/10 he underwent an uncomplicated R VATS, RA RUL wedge completion right upper lobectomy and mediastinal lymph node dissection. Post op he was brought to [facility] with 1 CT in place. POD1 CT placed to water seal with stable CXR. POD 2 pt still with large airleak, mild SOB overnight which has resolved. Plan to continue with CT to waterseal, plan to pull tube back a few centemeters later today.     Plan:    Neurovascular:   -Pain well controlled with current regimen. PRN's: tylenol, dilaudid, oxy  -Dementia    -c/w donepezil    Cardiovascular:   -Hemodynamically stable.   -Monitor: BP, HR, tele  -HTN    -c/w norvasc 5mg qd    -c/w Metop 25 mg Q12h  -HLD    -c/w lipitor    Respiratory:   -Oxygenating well on room air  -Encourage continued use of IS 10x/hr and frequent ambulation  -CXR: apical ptx  -CT management    -c/w CT to waterseal    -will reposition CT later today    GI:  -GI PPX:  -PO Diet  -Bowel Regimen:     Renal / :  -Continue to monitor renal function: BUN/Cr  -Monitor I/O's daily     Endocrine:    -No hx of DM or thyroid dx  -A1c:  -TSH:    Hematologic:  -CBC: H/H-  -Coagulation Panel.    ID:  -Temperature:   -CBC: WBC-  -Continue to observe for SIRS/Sepsis Syndrome.    Prophylaxis:  -DVT prophylaxis with 5000 SubQ Heparin q8h.  -Continue with SCD's b/l while patient is at rest     Disposition:  -Discharge home once patient is medically ready   This is a 72 y/o male, former smoker (quit 16 years ago, 45 pack year history), with PMHx of prostate cancer s/p prostatectomy, COPD, and urinary frequency.  Referred to Dr. Navas for evaluation of pulmonary nodule by Dr. Britta Camacho. Deemed a good candidate for intervention. On 1/10 he underwent an uncomplicated R VATS, RA RUL wedge completion right upper lobectomy and mediastinal lymph node dissection. Post op he was brought to [facility] with 1 CT in place. POD1 CT placed to water seal with stable CXR. POD 2 pt still with large airleak, mild SOB overnight which has resolved. Plan to continue with CT to waterseal, plan to pull tube back a few centemeters later today.     Plan:    Neurovascular:   -Pain well controlled with current regimen. PRN's: tylenol, dilaudid, oxy  -Dementia    -c/w donepezil    Cardiovascular:   -Hemodynamically stable.   -Monitor: BP, HR, tele  -HTN    -c/w norvasc 5mg qd    -c/w Metop 25 mg Q12h  -HLD    -c/w lipitor    Respiratory:   -Oxygenating well on room air  -Encourage continued use of IS 10x/hr and frequent ambulation  -CXR: apical ptx  -CT management    -c/w CT to waterseal    -will reposition CT later today    GI:  -GI PPX:  -PO Diet  -Bowel Regimen:     Renal / :  -Continue to monitor renal function: BUN/Cr  -Monitor I/O's daily     Endocrine:    -No hx of DM or thyroid dx  -A1c:  -TSH:    Hematologic:  -CBC: H/H-  -Coagulation Panel.    ID:  -Temperature:   -CBC: WBC-  -Continue to observe for SIRS/Sepsis Syndrome.    Prophylaxis:  -DVT prophylaxis with 5000 SubQ Heparin q8h.  -Continue with SCD's b/l while patient is at rest     Disposition:  -Discharge home once patient is medically ready   This is a 72 y/o male, former smoker (quit 16 years ago, 45 pack year history), with PMHx of prostate cancer s/p prostatectomy, COPD, and urinary frequency.  Referred to Dr. Navas for evaluation of pulmonary nodule by Dr. Britta Camacho. Deemed a good candidate for intervention. On 1/10 he underwent an uncomplicated R VATS, RA RUL wedge completion right upper lobectomy and mediastinal lymph node dissection. Post op he was brought to [facility] with 1 CT in place. POD1 CT placed to water seal with stable CXR. POD 2 pt still with large airleak, mild SOB overnight which has resolved. Plan to continue with CT to waterseal, plan to pull tube back a few centemeters later today.     Plan:    Neurovascular:   -Pain well controlled with current regimen. PRN's: tylenol, dilaudid, oxy  -Dementia    -c/w donepezil    Cardiovascular:   -Hemodynamically stable.   -Monitor: BP, HR, tele  -HTN    -c/w norvasc 5mg qd    -c/w Metop 25 mg Q12h  -HLD    -c/w lipitor    Respiratory:   -Oxygenating well on room air  -Encourage continued use of IS 10x/hr and frequent ambulation  -CXR: apical ptx  -CT management    -c/w CT to waterseal    -will reposition CT later today    GI:  -GI PPX: pepcid  -PO Diet  -Bowel Regimen: miralax, senna    Renal / :  -Continue to monitor renal function: BUN/Cr 29/1.4  -continue to monitor Cr, avoid nephrotoxic drugs  -Monitor I/O's daily     Endocrine:    -No hx of DM or thyroid dx  -A1c: 6  -TSH: .3    Hematologic:  -CBC: H/H- 12/38  -Coagulation Panel.    ID:  -Temperature: 36.4  -CBC: WBC- 12  -Continue to observe for SIRS/Sepsis Syndrome.    Prophylaxis:  -DVT prophylaxis with lovenox  -Continue with SCD's b/l while patient is at rest     Disposition:  -Discharge home once patient is medically ready   This is a 72 y/o male, former smoker (quit 16 years ago, 45 pack year history), with PMHx of prostate cancer s/p prostatectomy, COPD, and urinary frequency.  Referred to Dr. Navas for evaluation of pulmonary nodule by Dr. Britta Camacho. Deemed a good candidate for intervention. On 1/10 he underwent an uncomplicated R VATS, RA RUL wedge completion right upper lobectomy and mediastinal lymph node dissection. Post op he was brought to [facility] with 1 CT in place. POD1 CT placed to water seal with stable CXR. POD 2 pt still with large airleak, mild SOB overnight which has resolved. Plan to continue with CT to waterseal, plan to pull tube back a few centemeters later today.     Plan:    Neurovascular:   -Pain well controlled with current regimen. PRN's: tylenol, dilaudid, oxy  -Dementia    -c/w donepezil    Cardiovascular:   -Hemodynamically stable.   -Monitor: BP, HR, tele  -HTN    -c/w norvasc 5mg qd    -c/w Metop 25 mg Q12h  -HLD    -c/w lipitor    Respiratory:   -Oxygenating well on room air  -Encourage continued use of IS 10x/hr and frequent ambulation  -CXR: apical ptx  -CT management    -c/w CT to waterseal    -will reposition CT later today    GI:  -GI PPX: pepcid  -PO Diet  -Bowel Regimen: miralax, senna    Renal / :  -Continue to monitor renal function: BUN/Cr 29/1.4  -continue to monitor Cr, avoid nephrotoxic drugs  -Monitor I/O's daily     Endocrine:    -No hx of DM or thyroid dx  -A1c: 6  -TSH: .3    Hematologic:  -CBC: H/H- 12/38  -Coagulation Panel.    ID:  -Temperature: 36.4  -CBC: WBC- 12  -Continue to observe for SIRS/Sepsis Syndrome.    Prophylaxis:  -DVT prophylaxis with lovenox  -Continue with SCD's b/l while patient is at rest     Disposition:  -Discharge home once patient is medically ready

## 2024-01-12 NOTE — PROGRESS NOTE ADULT - SUBJECTIVE AND OBJECTIVE BOX
Patient discussed on morning rounds with Dr. Navas    OPERATION & DATE: 1/10 LANE RUIZ VATS, RUL wedge, completion upper lobectomy, MLND.     SUBJECTIVE ASSESSMENT: resting comfortably in bed, NAD, no acute complaints today.     VITAL SIGNS:  Vital Signs Last 24 Hrs  T(C): 36.3 (12 Jan 2024 09:00), Max: 36.4 (11 Jan 2024 14:31)  T(F): 97.4 (12 Jan 2024 09:00), Max: 97.6 (11 Jan 2024 14:31)  HR: 56 (12 Jan 2024 10:02) (53 - 62)  BP: 130/60 (12 Jan 2024 10:02) (130/60 - 151/68)  BP(mean): 87 (12 Jan 2024 10:02) (87 - 100)  RR: 15 (12 Jan 2024 10:02) (15 - 18)  SpO2: 95% (12 Jan 2024 10:02) (94% - 97%)    Parameters below as of 12 Jan 2024 10:02  Patient On (Oxygen Delivery Method): room air      I&O's Detail    11 Jan 2024 07:01  -  12 Jan 2024 07:00  --------------------------------------------------------  IN:    IV PiggyBack: 100 mL    Oral Fluid: 720 mL  Total IN: 820 mL    OUT:    Chest Tube (mL): 150 mL    Voided (mL): 2550 mL  Total OUT: 2700 mL    Total NET: -1880 mL      12 Jan 2024 07:01  -  12 Jan 2024 11:46  --------------------------------------------------------  IN:  Total IN: 0 mL    OUT:    Voided (mL): 200 mL  Total OUT: 200 mL    Total NET: -200 mL        CHEST TUBE:  1 R sided CT to suction  PATSY DRAIN:  none  EPICARDIAL WIRES: none  STITCHES: none  STAPLES: none  DIMAS: none  CENTRAL LINE: none  MIDLINE/PICC: none  WOUND VAC: none    PHYSICAL EXAM:  General: resting comfortably in bed in NAD  Neurological: AOx3. Motor skills grossly intact  Cardiovascular: Normal S1/S2. Regular rate/rhythm. No murmurs  Respiratory: Lungs CTA bilaterally. No wheezing or rales  Gastrointestinal: +BS in all 4 quadrants. Non-distended. Soft. Non-tender  Extremities: Strength 5/5 b/l upper/lower extremities. Sensation grossly intact upper/lower extremities. No edema. No calf tenderness.  Vascular: Radial 2+bilaterally, DP 2+ b/l  Incision Sites: R VATS incisions covered by dressing      LABS:                        12.1   12.35 )-----------( 189      ( 12 Jan 2024 07:29 )             37.8     PT/INR - ( 12 Jan 2024 07:29 )   PT: 9.9 sec;   INR: 0.86          PTT - ( 12 Jan 2024 07:29 )  PTT:25.9 sec  01-12    140  |  105  |  29<H>  ----------------------------<  100<H>  4.8   |  27  |  1.41<H>    Ca    9.2      12 Jan 2024 07:29  Mg     2.2     01-12      Urinalysis Basic - ( 12 Jan 2024 07:29 )    Color: x / Appearance: x / SG: x / pH: x  Gluc: 100 mg/dL / Ketone: x  / Bili: x / Urobili: x   Blood: x / Protein: x / Nitrite: x   Leuk Esterase: x / RBC: x / WBC x   Sq Epi: x / Non Sq Epi: x / Bacteria: x      MEDICATIONS  (STANDING):  acetaminophen     Tablet .. 1000 milliGRAM(s) Oral every 6 hours  amLODIPine   Tablet 5 milliGRAM(s) Oral daily  atorvastatin 10 milliGRAM(s) Oral at bedtime  donepezil 5 milliGRAM(s) Oral at bedtime  enoxaparin Injectable 40 milliGRAM(s) SubCutaneous every 24 hours  famotidine    Tablet 20 milliGRAM(s) Oral every 12 hours  lidocaine   4% Patch 1 Patch Transdermal every 24 hours  metoprolol tartrate 25 milliGRAM(s) Oral every 12 hours  oxybutynin 5 milliGRAM(s) Oral two times a day  polyethylene glycol 3350 17 Gram(s) Oral daily  senna 2 Tablet(s) Oral at bedtime    MEDICATIONS  (PRN):  HYDROmorphone  Injectable 0.25 milliGRAM(s) IV Push every 3 hours PRN Severe Pain (7 - 10)  ipratropium    for Nebulization 500 MICROGram(s) Nebulizer every 6 hours PRN Shortness of Breath and/or Wheezing  oxyCODONE    IR 5 milliGRAM(s) Oral every 4 hours PRN Moderate Pain (4 - 6)    RADIOLOGY & ADDITIONAL TESTS:

## 2024-01-13 LAB
ANION GAP SERPL CALC-SCNC: 9 MMOL/L — SIGNIFICANT CHANGE UP (ref 5–17)
ANION GAP SERPL CALC-SCNC: 9 MMOL/L — SIGNIFICANT CHANGE UP (ref 5–17)
BUN SERPL-MCNC: 24 MG/DL — HIGH (ref 7–23)
BUN SERPL-MCNC: 24 MG/DL — HIGH (ref 7–23)
CALCIUM SERPL-MCNC: 9.2 MG/DL — SIGNIFICANT CHANGE UP (ref 8.4–10.5)
CALCIUM SERPL-MCNC: 9.2 MG/DL — SIGNIFICANT CHANGE UP (ref 8.4–10.5)
CHLORIDE SERPL-SCNC: 103 MMOL/L — SIGNIFICANT CHANGE UP (ref 96–108)
CHLORIDE SERPL-SCNC: 103 MMOL/L — SIGNIFICANT CHANGE UP (ref 96–108)
CO2 SERPL-SCNC: 26 MMOL/L — SIGNIFICANT CHANGE UP (ref 22–31)
CO2 SERPL-SCNC: 26 MMOL/L — SIGNIFICANT CHANGE UP (ref 22–31)
CREAT SERPL-MCNC: 1.34 MG/DL — HIGH (ref 0.5–1.3)
CREAT SERPL-MCNC: 1.34 MG/DL — HIGH (ref 0.5–1.3)
EGFR: 56 ML/MIN/1.73M2 — LOW
EGFR: 56 ML/MIN/1.73M2 — LOW
GLUCOSE SERPL-MCNC: 97 MG/DL — SIGNIFICANT CHANGE UP (ref 70–99)
GLUCOSE SERPL-MCNC: 97 MG/DL — SIGNIFICANT CHANGE UP (ref 70–99)
HCT VFR BLD CALC: 38.9 % — LOW (ref 39–50)
HCT VFR BLD CALC: 38.9 % — LOW (ref 39–50)
HGB BLD-MCNC: 12.3 G/DL — LOW (ref 13–17)
HGB BLD-MCNC: 12.3 G/DL — LOW (ref 13–17)
MAGNESIUM SERPL-MCNC: 2.1 MG/DL — SIGNIFICANT CHANGE UP (ref 1.6–2.6)
MAGNESIUM SERPL-MCNC: 2.1 MG/DL — SIGNIFICANT CHANGE UP (ref 1.6–2.6)
MCHC RBC-ENTMCNC: 30.1 PG — SIGNIFICANT CHANGE UP (ref 27–34)
MCHC RBC-ENTMCNC: 30.1 PG — SIGNIFICANT CHANGE UP (ref 27–34)
MCHC RBC-ENTMCNC: 31.6 GM/DL — LOW (ref 32–36)
MCHC RBC-ENTMCNC: 31.6 GM/DL — LOW (ref 32–36)
MCV RBC AUTO: 95.3 FL — SIGNIFICANT CHANGE UP (ref 80–100)
MCV RBC AUTO: 95.3 FL — SIGNIFICANT CHANGE UP (ref 80–100)
NRBC # BLD: 0 /100 WBCS — SIGNIFICANT CHANGE UP (ref 0–0)
NRBC # BLD: 0 /100 WBCS — SIGNIFICANT CHANGE UP (ref 0–0)
PLATELET # BLD AUTO: 212 K/UL — SIGNIFICANT CHANGE UP (ref 150–400)
PLATELET # BLD AUTO: 212 K/UL — SIGNIFICANT CHANGE UP (ref 150–400)
POTASSIUM SERPL-MCNC: 4.3 MMOL/L — SIGNIFICANT CHANGE UP (ref 3.5–5.3)
POTASSIUM SERPL-MCNC: 4.3 MMOL/L — SIGNIFICANT CHANGE UP (ref 3.5–5.3)
POTASSIUM SERPL-SCNC: 4.3 MMOL/L — SIGNIFICANT CHANGE UP (ref 3.5–5.3)
POTASSIUM SERPL-SCNC: 4.3 MMOL/L — SIGNIFICANT CHANGE UP (ref 3.5–5.3)
RBC # BLD: 4.08 M/UL — LOW (ref 4.2–5.8)
RBC # BLD: 4.08 M/UL — LOW (ref 4.2–5.8)
RBC # FLD: 14.5 % — SIGNIFICANT CHANGE UP (ref 10.3–14.5)
RBC # FLD: 14.5 % — SIGNIFICANT CHANGE UP (ref 10.3–14.5)
SODIUM SERPL-SCNC: 138 MMOL/L — SIGNIFICANT CHANGE UP (ref 135–145)
SODIUM SERPL-SCNC: 138 MMOL/L — SIGNIFICANT CHANGE UP (ref 135–145)
WBC # BLD: 10.67 K/UL — HIGH (ref 3.8–10.5)
WBC # BLD: 10.67 K/UL — HIGH (ref 3.8–10.5)
WBC # FLD AUTO: 10.67 K/UL — HIGH (ref 3.8–10.5)
WBC # FLD AUTO: 10.67 K/UL — HIGH (ref 3.8–10.5)

## 2024-01-13 PROCEDURE — 71045 X-RAY EXAM CHEST 1 VIEW: CPT | Mod: 26

## 2024-01-13 RX ORDER — METOPROLOL TARTRATE 50 MG
12.5 TABLET ORAL EVERY 12 HOURS
Refills: 0 | Status: DISCONTINUED | OUTPATIENT
Start: 2024-01-13 | End: 2024-01-17

## 2024-01-13 RX ADMIN — ATORVASTATIN CALCIUM 10 MILLIGRAM(S): 80 TABLET, FILM COATED ORAL at 21:33

## 2024-01-13 RX ADMIN — Medication 1000 MILLIGRAM(S): at 17:07

## 2024-01-13 RX ADMIN — LIDOCAINE 1 PATCH: 4 CREAM TOPICAL at 17:06

## 2024-01-13 RX ADMIN — LIDOCAINE 1 PATCH: 4 CREAM TOPICAL at 20:23

## 2024-01-13 RX ADMIN — Medication 1000 MILLIGRAM(S): at 12:00

## 2024-01-13 RX ADMIN — OXYCODONE HYDROCHLORIDE 5 MILLIGRAM(S): 5 TABLET ORAL at 17:07

## 2024-01-13 RX ADMIN — ENOXAPARIN SODIUM 40 MILLIGRAM(S): 100 INJECTION SUBCUTANEOUS at 17:06

## 2024-01-13 RX ADMIN — Medication 1000 MILLIGRAM(S): at 23:29

## 2024-01-13 RX ADMIN — OXYCODONE HYDROCHLORIDE 5 MILLIGRAM(S): 5 TABLET ORAL at 22:00

## 2024-01-13 RX ADMIN — LIDOCAINE 1 PATCH: 4 CREAM TOPICAL at 05:29

## 2024-01-13 RX ADMIN — OXYCODONE HYDROCHLORIDE 5 MILLIGRAM(S): 5 TABLET ORAL at 21:32

## 2024-01-13 RX ADMIN — FAMOTIDINE 20 MILLIGRAM(S): 10 INJECTION INTRAVENOUS at 05:24

## 2024-01-13 RX ADMIN — POLYETHYLENE GLYCOL 3350 17 GRAM(S): 17 POWDER, FOR SOLUTION ORAL at 11:02

## 2024-01-13 RX ADMIN — AMLODIPINE BESYLATE 5 MILLIGRAM(S): 2.5 TABLET ORAL at 05:24

## 2024-01-13 RX ADMIN — OXYCODONE HYDROCHLORIDE 5 MILLIGRAM(S): 5 TABLET ORAL at 12:00

## 2024-01-13 RX ADMIN — Medication 5 MILLIGRAM(S): at 17:07

## 2024-01-13 RX ADMIN — SENNA PLUS 2 TABLET(S): 8.6 TABLET ORAL at 21:35

## 2024-01-13 RX ADMIN — DONEPEZIL HYDROCHLORIDE 5 MILLIGRAM(S): 10 TABLET, FILM COATED ORAL at 21:32

## 2024-01-13 RX ADMIN — Medication 1000 MILLIGRAM(S): at 11:01

## 2024-01-13 RX ADMIN — OXYCODONE HYDROCHLORIDE 5 MILLIGRAM(S): 5 TABLET ORAL at 18:00

## 2024-01-13 RX ADMIN — Medication 1000 MILLIGRAM(S): at 18:00

## 2024-01-13 RX ADMIN — Medication 1000 MILLIGRAM(S): at 05:23

## 2024-01-13 RX ADMIN — FAMOTIDINE 20 MILLIGRAM(S): 10 INJECTION INTRAVENOUS at 17:07

## 2024-01-13 RX ADMIN — OXYCODONE HYDROCHLORIDE 5 MILLIGRAM(S): 5 TABLET ORAL at 11:01

## 2024-01-13 RX ADMIN — Medication 25 MILLIGRAM(S): at 05:23

## 2024-01-13 RX ADMIN — Medication 5 MILLIGRAM(S): at 05:23

## 2024-01-13 RX ADMIN — Medication 1000 MILLIGRAM(S): at 05:30

## 2024-01-13 NOTE — PROGRESS NOTE ADULT - SUBJECTIVE AND OBJECTIVE BOX
Patient discussed on morning rounds with Dr. Navas, [place]    OPERATION & DATE: 1/10/23 -- R VATS RUL wedge completion right upper lobectomy     SUBJECTIVE ASSESSMENT: Pt is feeling well, no complaints. Denies any chest pain, palpitations, orthopnea, dyspnea on exertion, shortness of breath, wheezing, abd pain, nausea, vomiting, constipation, lightheadedness, headaches, fevers, or chills.    VITAL SIGNS:  Vital Signs Last 24 Hrs  T(C): 36.4 (13 Jan 2024 08:47), Max: 36.8 (13 Jan 2024 00:54)  T(F): 97.5 (13 Jan 2024 08:47), Max: 98.3 (13 Jan 2024 00:54)  HR: 63 (13 Jan 2024 08:50) (57 - 63)  BP: 149/66 (13 Jan 2024 08:50) (119/59 - 164/73)  BP(mean): 95 (13 Jan 2024 08:50) (85 - 108)  RR: 17 (13 Jan 2024 08:50) (15 - 17)  SpO2: 95% (13 Jan 2024 08:50) (92% - 96%)    Parameters below as of 13 Jan 2024 08:50  Patient On (Oxygen Delivery Method): room air      I&O's Detail    12 Jan 2024 07:01  -  13 Jan 2024 07:00  --------------------------------------------------------  IN:    Oral Fluid: 780 mL  Total IN: 780 mL    OUT:    Chest Tube (mL): 140 mL    IV PiggyBack: 0 mL    Voided (mL): 950 mL  Total OUT: 1090 mL    Total NET: -310 mL    CHEST TUBE:  yes, on waterseal, +air leak   PATSY DRAIN:  no  EPICARDIAL WIRES: no  STITCHES: no  STAPLES: no  DIMAS:  no  CENTRAL LINE: no  MIDLINE/PICC: no  WOUND VAC: no     PHYSICAL EXAM:  General: well appearing sitting in chair in NAD   HEENT: normocephalic, atraumatic   Cardio: normal S1/S2   Pulm: lungs CTA b/l   GI: +BS 4 quadrants,, soft, nontender   Extremities: no edema, no calf tenderness   Vascular: DP 2+ b/l, Radial 2+ b/l   Incisions: right VATS incisions without erythema, purulence or ecchymosis.     LABS:                        12.3   10.67 )-----------( 212      ( 13 Jan 2024 05:30 )             38.9     PT/INR - ( 12 Jan 2024 07:29 )   PT: 9.9 sec;   INR: 0.86     PTT - ( 12 Jan 2024 07:29 )  PTT:25.9 sec    01-13    138  |  103  |  24<H>  ----------------------------<  97  4.3   |  26  |  1.34<H>    Ca    9.2      13 Jan 2024 05:30  Mg     2.1     01-13    Urinalysis Basic - ( 13 Jan 2024 05:30 )    Color: x / Appearance: x / SG: x / pH: x  Gluc: 97 mg/dL / Ketone: x  / Bili: x / Urobili: x   Blood: x / Protein: x / Nitrite: x   Leuk Esterase: x / RBC: x / WBC x   Sq Epi: x / Non Sq Epi: x / Bacteria: x    MEDICATIONS  (STANDING):  acetaminophen     Tablet .. 1000 milliGRAM(s) Oral every 6 hours  amLODIPine   Tablet 5 milliGRAM(s) Oral daily  atorvastatin 10 milliGRAM(s) Oral at bedtime  donepezil 5 milliGRAM(s) Oral at bedtime  enoxaparin Injectable 40 milliGRAM(s) SubCutaneous every 24 hours  famotidine    Tablet 20 milliGRAM(s) Oral every 12 hours  lidocaine   4% Patch 1 Patch Transdermal every 24 hours  metoprolol tartrate 25 milliGRAM(s) Oral every 12 hours  oxybutynin 5 milliGRAM(s) Oral two times a day  polyethylene glycol 3350 17 Gram(s) Oral daily  senna 2 Tablet(s) Oral at bedtime    MEDICATIONS  (PRN):  HYDROmorphone  Injectable 0.25 milliGRAM(s) IV Push every 3 hours PRN Severe Pain (7 - 10)  ipratropium    for Nebulization 500 MICROGram(s) Nebulizer every 6 hours PRN Shortness of Breath and/or Wheezing  oxyCODONE    IR 5 milliGRAM(s) Oral every 4 hours PRN Moderate Pain (4 - 6)    RADIOLOGY & ADDITIONAL TESTS:  < from: Xray Chest 1 View- PORTABLE-Routine (Xray Chest 1 View- PORTABLE-Routine in AM.) (01.13.24 @ 06:30) >  IMPRESSION: No acute infiltrates. Small right apical pneumothorax. Right   chest tube noted  < end of copied text >     Patient discussed on morning rounds with Dr. Miner    OPERATION & DATE: 1/10/23 -- R VATS RUL wedge completion right upper lobectomy     SUBJECTIVE ASSESSMENT: Pt is feeling well, no complaints. Denies any chest pain, palpitations, orthopnea, dyspnea on exertion, shortness of breath, wheezing, abd pain, nausea, vomiting, constipation, lightheadedness, headaches, fevers, or chills.    VITAL SIGNS:  Vital Signs Last 24 Hrs  T(C): 36.4 (13 Jan 2024 08:47), Max: 36.8 (13 Jan 2024 00:54)  T(F): 97.5 (13 Jan 2024 08:47), Max: 98.3 (13 Jan 2024 00:54)  HR: 63 (13 Jan 2024 08:50) (57 - 63)  BP: 149/66 (13 Jan 2024 08:50) (119/59 - 164/73)  BP(mean): 95 (13 Jan 2024 08:50) (85 - 108)  RR: 17 (13 Jan 2024 08:50) (15 - 17)  SpO2: 95% (13 Jan 2024 08:50) (92% - 96%)    Parameters below as of 13 Jan 2024 08:50  Patient On (Oxygen Delivery Method): room air      I&O's Detail    12 Jan 2024 07:01  -  13 Jan 2024 07:00  --------------------------------------------------------  IN:    Oral Fluid: 780 mL  Total IN: 780 mL    OUT:    Chest Tube (mL): 140 mL    IV PiggyBack: 0 mL    Voided (mL): 950 mL  Total OUT: 1090 mL    Total NET: -310 mL    CHEST TUBE:  yes, on waterseal, +air leak   PATSY DRAIN:  no  EPICARDIAL WIRES: no  STITCHES: no  STAPLES: no  DIMAS:  no  CENTRAL LINE: no  MIDLINE/PICC: no  WOUND VAC: no     PHYSICAL EXAM:  General: well appearing sitting in chair in NAD   HEENT: normocephalic, atraumatic   Cardio: normal S1/S2   Pulm: lungs CTA b/l   GI: +BS 4 quadrants,, soft, nontender   Extremities: no edema, no calf tenderness   Vascular: DP 2+ b/l, Radial 2+ b/l   Incisions: right VATS incisions without erythema, purulence or ecchymosis.     LABS:                        12.3   10.67 )-----------( 212      ( 13 Jan 2024 05:30 )             38.9     PT/INR - ( 12 Jan 2024 07:29 )   PT: 9.9 sec;   INR: 0.86     PTT - ( 12 Jan 2024 07:29 )  PTT:25.9 sec    01-13    138  |  103  |  24<H>  ----------------------------<  97  4.3   |  26  |  1.34<H>    Ca    9.2      13 Jan 2024 05:30  Mg     2.1     01-13    Urinalysis Basic - ( 13 Jan 2024 05:30 )    Color: x / Appearance: x / SG: x / pH: x  Gluc: 97 mg/dL / Ketone: x  / Bili: x / Urobili: x   Blood: x / Protein: x / Nitrite: x   Leuk Esterase: x / RBC: x / WBC x   Sq Epi: x / Non Sq Epi: x / Bacteria: x    MEDICATIONS  (STANDING):  acetaminophen     Tablet .. 1000 milliGRAM(s) Oral every 6 hours  amLODIPine   Tablet 5 milliGRAM(s) Oral daily  atorvastatin 10 milliGRAM(s) Oral at bedtime  donepezil 5 milliGRAM(s) Oral at bedtime  enoxaparin Injectable 40 milliGRAM(s) SubCutaneous every 24 hours  famotidine    Tablet 20 milliGRAM(s) Oral every 12 hours  lidocaine   4% Patch 1 Patch Transdermal every 24 hours  metoprolol tartrate 25 milliGRAM(s) Oral every 12 hours  oxybutynin 5 milliGRAM(s) Oral two times a day  polyethylene glycol 3350 17 Gram(s) Oral daily  senna 2 Tablet(s) Oral at bedtime    MEDICATIONS  (PRN):  HYDROmorphone  Injectable 0.25 milliGRAM(s) IV Push every 3 hours PRN Severe Pain (7 - 10)  ipratropium    for Nebulization 500 MICROGram(s) Nebulizer every 6 hours PRN Shortness of Breath and/or Wheezing  oxyCODONE    IR 5 milliGRAM(s) Oral every 4 hours PRN Moderate Pain (4 - 6)    RADIOLOGY & ADDITIONAL TESTS:  < from: Xray Chest 1 View- PORTABLE-Routine (Xray Chest 1 View- PORTABLE-Routine in AM.) (01.13.24 @ 06:30) >  IMPRESSION: No acute infiltrates. Small right apical pneumothorax. Right   chest tube noted  < end of copied text >

## 2024-01-13 NOTE — PROGRESS NOTE ADULT - ASSESSMENT
74 y/o M, former smoker (quit 16 years ago, 45 pack year history), with PMHx of prostate cancer (s/p prostatectomy), COPD, and urinary frequency.  Referred to Dr. Navas for evaluation of pulmonary nodule by Dr. Britta Camacho. Deemed a good candidate for intervention. On 1/10 he underwent an uncomplicated R VATS, RA RUL wedge completion right upper lobectomy and mediastinal lymph node dissection. Post op he was brought to [facility] with 1 CT in place. POD1 CT placed to water seal with stable CXR. POD 2 pt still with large airleak, mild SOB overnight which has resolved. Plan to continue with CT to waterseal. Slight increase in Cr. POD3, pt remains with air leak on waterseal, keeping chest tube to waterseal today. Cr. downtrending.     Plan:    Neurovascular:   -Pain well controlled with current regimen. PRN's: acetaminophen, lidocaine  -continue home medication: donezepil     Cardiovascular:   -Hemodynamically stable.   -Monitor: BP, HR, tele    Respiratory:   -hx of R VATS, RA RUL wedge completion right upper lobectomy and mediastinal lymph node dissection    -1 CT on waterseal, + air leak  -hx of COPD   -Oxygenating well on room air  -Encourage continued use of IS 10x/hr and frequent ambulation  -CXR: right chest tube in place, +small air space apical    GI:  -GI PPX: famotidine   -PO Diet: Regular   -Bowel Regimen: miralax     Renal / :  -hx prostate cancer (s/p prostatectomy)  -mild LAYA    -improving, continue to monitor BUN/Cr    -BUN/Cr 24/1.34   -Monitor I/O's daily     Endocrine:    -No hx of DM or thyroid dx  -A1c: 6.0  -TSH: 0.32    Hematologic:  -CBC: H/H- 12/38  -Coagulation Panel.    ID:  -Temperature: afebrile   -CBC: WBC- 10.6   -Continue to observe for SIRS/Sepsis Syndrome.    Prophylaxis:  -DVT prophylaxis with 5000 SubQ Heparin q8h.  -Continue with SCD's b/l while patient is at rest     Disposition:  -CT management    74 y/o M, former smoker (quit 16 years ago, 45 pack year history), with PMHx of prostate cancer (s/p prostatectomy), COPD, and urinary frequency.  Referred to Dr. Navas for evaluation of pulmonary nodule by Dr. Britta Camacho. Deemed a good candidate for intervention. On 1/10 he underwent an uncomplicated R VATS, RA RUL wedge completion right upper lobectomy and mediastinal lymph node dissection. Post op he was brought to [facility] with 1 CT in place. POD1 CT placed to water seal with stable CXR. POD 2 pt still with large airleak, mild SOB overnight which has resolved. Plan to continue with CT to waterseal. Slight increase in Cr. POD3, pt remains with air leak on waterseal, keeping chest tube to waterseal today. Cr. downtrending.     Plan:    Neurovascular:   -Pain well controlled with current regimen. PRN's: acetaminophen, lidocaine  -continue home medication: donezepil     Cardiovascular:   -Hemodynamically stable.   -Monitor: BP, HR, tele    Respiratory:   -hx of R VATS, RA RUL wedge completion right upper lobectomy and mediastinal lymph node dissection    -1 CT on waterseal, + air leak  -hx of COPD   -Oxygenating well on room air  -Encourage continued use of IS 10x/hr and frequent ambulation  -CXR: right chest tube in place, +small air space apical    GI:  -GI PPX: famotidine   -PO Diet: Regular   -Bowel Regimen: miralax     Renal / :  -hx prostate cancer (s/p prostatectomy)  -mild LAYA    -improving, continue to monitor BUN/Cr    -BUN/Cr 24/1.34   -Monitor I/O's daily     Endocrine:    -No hx of DM or thyroid dx  -A1c: 6.0  -TSH: 0.32    Hematologic:  -CBC: H/H- 12/38  -Coagulation Panel.    ID:  -Temperature: afebrile   -CBC: WBC- 10.6   -Continue to observe for SIRS/Sepsis Syndrome.    Prophylaxis:  -DVT prophylaxis with 5000 SubQ Heparin q8h.  -Continue with SCD's b/l while patient is at rest     Disposition:  -CT management    74 y/o M, former smoker (quit 16 years ago, 45 pack year history), with PMHx of prostate cancer (s/p prostatectomy), COPD, and urinary frequency.  Referred to Dr. Navas for evaluation of pulmonary nodule by Dr. Britta Camacho. Deemed a good candidate for intervention. On 1/10 he underwent an uncomplicated R VATS, RA RUL wedge completion right upper lobectomy and mediastinal lymph node dissection. Post op he was brought to [facility] with 1 CT in place. POD1 CT placed to water seal with stable CXR. POD 2 pt still with large airleak, mild SOB overnight which has resolved. Plan to continue with CT to waterseal. Slight increase in Cr. POD3, pt remains with air leak on waterseal, keeping chest tube to waterseal today. Cr. downtrending.     Plan:  Neurovascular:   -Pain well controlled with current regimen. PRN's: acetaminophen, lidocaine, oxycodone  -continue home medication: donezepil     Cardiovascular:   -hx of HTN: continue amlodipine 5  -hx of HLD: continue atorvastatin 10  -Hemodynamically stable.   -Monitor: BP, HR, tele    Respiratory:   -hx of R VATS, RA RUL wedge completion right upper lobectomy and mediastinal lymph node dissection    -1 CT on waterseal, + air leak  -hx of COPD: continue with atrovent PRN   -Oxygenating well on room air  -Encourage continued use of IS 10x/hr and frequent ambulation  -CXR: right chest tube in place, +small air space apical    GI:  -GI PPX: famotidine   -PO Diet: Regular   -Bowel Regimen: miralax     Renal / :  -hx prostate cancer (s/p prostatectomy)  -bladder spasm: continue with oxybutynin bid   -mild LAYA    -improving, continue to monitor BUN/Cr    -BUN/Cr 24/1.34   -Monitor I/O's daily     Endocrine:    -No hx of DM or thyroid dx  -A1c: 6.0  -TSH: 0.32    Hematologic:  -CBC: H/H- 12/38  -Coagulation Panel.    ID:  -Temperature: afebrile   -CBC: WBC- 10.6   -Continue to observe for SIRS/Sepsis Syndrome.    Prophylaxis:  -DVT prophylaxis with 5000 SubQ Heparin q8h.  -Continue with SCD's b/l while patient is at rest     Disposition:  -CT management    74 y/o M, former smoker (quit 16 years ago, 45 pack year history), with PMHx of prostate cancer (s/p prostatectomy), COPD, and urinary frequency.  Referred to Dr. Navas for evaluation of pulmonary nodule by Dr. Britta Camacho. Deemed a good candidate for intervention. On 1/10 he underwent an uncomplicated R VATS, RA RUL wedge completion right upper lobectomy and mediastinal lymph node dissection. Post op he was brought to [facility] with 1 CT in place. POD1 CT placed to water seal with stable CXR. POD 2 pt still with large airleak, mild SOB overnight which has resolved. Plan to continue with CT to waterseal. Slight increase in Cr. POD3, pt remains with air leak on waterseal, keeping chest tube to waterseal today. Cr. downtrending.     Plan:  Neurovascular:   -Pain well controlled with current regimen. PRN's: acetaminophen, lidocaine, oxycodone  -continue home medication: donezepil     Cardiovascular:   -hx of HTN: continue amlodipine 5  -hx of HLD: continue atorvastatin 10  -Hemodynamically stable.   -Monitor: BP, HR, tele    Respiratory:   -hx of R VATS, RA RUL wedge completion right upper lobectomy and mediastinal lymph node dissection    -1 CT on waterseal, + air leak  -hx of COPD: continue with atrovent PRN   -Oxygenating well on room air  -Encourage continued use of IS 10x/hr and frequent ambulation  -CXR: right chest tube in place, +small air space apical    GI:  -GI PPX: famotidine   -PO Diet: Regular   -Bowel Regimen: miralax     Renal / :  -hx prostate cancer (s/p prostatectomy)  -bladder spasm: continue with oxybutynin bid   -mild LAYA    -improving, continue to monitor BUN/Cr    -BUN/Cr 24/1.34   -Monitor I/O's daily     Endocrine:    -No hx of DM or thyroid dx  -A1c: 6.0  -TSH: 0.32    Hematologic:  -CBC: H/H- 12/38  -Coagulation Panel.    ID:  -Temperature: afebrile   -CBC: WBC- 10.6   -Continue to observe for SIRS/Sepsis Syndrome.    Prophylaxis:  -DVT prophylaxis with 5000 SubQ Heparin q8h.  -Continue with SCD's b/l while patient is at rest     Disposition:  -CT management    74 y/o M, former smoker (quit 16 years ago, 45 pack year history), with PMHx of prostate cancer (s/p prostatectomy), COPD, and urinary frequency.  Referred to Dr. Navas for evaluation of pulmonary nodule by Dr. Britta Camacho. Deemed a good candidate for intervention. On 1/10 he underwent an uncomplicated R VATS, RA RUL wedge completion right upper lobectomy and mediastinal lymph node dissection. Post op he was brought to [facility] with 1 CT in place. POD1 CT placed to water seal with stable CXR. POD 2 pt still with large airleak, mild SOB overnight which has resolved. Plan to continue with CT to waterseal. Slight increase in Cr. POD3, pt remains with air leak on waterseal, keeping chest tube to waterseal today. Cr. downtrending.     Plan:  Neurovascular:   -Pain well controlled with current regimen. PRN's: acetaminophen, lidocaine, oxycodone  -continue home medication: donezepil     Cardiovascular:   -hx of HTN: continue amlodipine 5, met tart 12.5mg Q12 hours   -hx of HLD: continue atorvastatin 10  -Hemodynamically stable.   -Monitor: BP, HR, tele    Respiratory:   -POD3 R VATS, RA RUL wedge completion right upper lobectomy and mediastinal lymph node dissection    -1 CT on waterseal, + air leak  -hx of COPD: continue with atrovent PRN   -Oxygenating well on room air  -Encourage continued use of IS 10x/hr and frequent ambulation  -CXR: right chest tube in place, +small air space apical    GI:  -GI PPX: famotidine   -PO Diet: Regular   -Bowel Regimen: miralax     Renal / :  -hx prostate cancer (s/p prostatectomy)  -bladder spasm: continue with oxybutynin bid   -mild LAYA    -improving, continue to monitor BUN/Cr    -BUN/Cr 24/1.34   -Monitor I/O's daily     Endocrine:    -No hx of DM or thyroid dx  -A1c: 6.0  -TSH: 0.32    Hematologic:  -CBC: H/H- 12/38  -Coagulation Panel.    ID:  -Temperature: afebrile   -CBC: WBC- 10.6   -Continue to observe for SIRS/Sepsis Syndrome.    Prophylaxis:  -DVT prophylaxis with lovenox  -Continue with SCD's b/l while patient is at rest     Disposition:  -CT management    72 y/o M, former smoker (quit 16 years ago, 45 pack year history), with PMHx of prostate cancer (s/p prostatectomy), COPD, and urinary frequency.  Referred to Dr. Navas for evaluation of pulmonary nodule by Dr. Britta Camacho. Deemed a good candidate for intervention. On 1/10 he underwent an uncomplicated R VATS, RA RUL wedge completion right upper lobectomy and mediastinal lymph node dissection. Post op he was brought to [facility] with 1 CT in place. POD1 CT placed to water seal with stable CXR. POD 2 pt still with large airleak, mild SOB overnight which has resolved. Plan to continue with CT to waterseal. Slight increase in Cr. POD3, pt remains with air leak on waterseal, keeping chest tube to waterseal today. Cr. downtrending.     Plan:  Neurovascular:   -Pain well controlled with current regimen. PRN's: acetaminophen, lidocaine, oxycodone  -continue home medication: donezepil     Cardiovascular:   -hx of HTN: continue amlodipine 5, met tart 12.5mg Q12 hours   -hx of HLD: continue atorvastatin 10  -Hemodynamically stable.   -Monitor: BP, HR, tele    Respiratory:   -POD3 R VATS, RA RUL wedge completion right upper lobectomy and mediastinal lymph node dissection    -1 CT on waterseal, + air leak  -hx of COPD: continue with atrovent PRN   -Oxygenating well on room air  -Encourage continued use of IS 10x/hr and frequent ambulation  -CXR: right chest tube in place, +small air space apical    GI:  -GI PPX: famotidine   -PO Diet: Regular   -Bowel Regimen: miralax     Renal / :  -hx prostate cancer (s/p prostatectomy)  -bladder spasm: continue with oxybutynin bid   -mild LAYA    -improving, continue to monitor BUN/Cr    -BUN/Cr 24/1.34   -Monitor I/O's daily     Endocrine:    -No hx of DM or thyroid dx  -A1c: 6.0  -TSH: 0.32    Hematologic:  -CBC: H/H- 12/38  -Coagulation Panel.    ID:  -Temperature: afebrile   -CBC: WBC- 10.6   -Continue to observe for SIRS/Sepsis Syndrome.    Prophylaxis:  -DVT prophylaxis with lovenox  -Continue with SCD's b/l while patient is at rest     Disposition:  -CT management

## 2024-01-14 LAB
ANION GAP SERPL CALC-SCNC: 8 MMOL/L — SIGNIFICANT CHANGE UP (ref 5–17)
ANION GAP SERPL CALC-SCNC: 8 MMOL/L — SIGNIFICANT CHANGE UP (ref 5–17)
BUN SERPL-MCNC: 20 MG/DL — SIGNIFICANT CHANGE UP (ref 7–23)
BUN SERPL-MCNC: 20 MG/DL — SIGNIFICANT CHANGE UP (ref 7–23)
CALCIUM SERPL-MCNC: 9 MG/DL — SIGNIFICANT CHANGE UP (ref 8.4–10.5)
CALCIUM SERPL-MCNC: 9 MG/DL — SIGNIFICANT CHANGE UP (ref 8.4–10.5)
CHLORIDE SERPL-SCNC: 105 MMOL/L — SIGNIFICANT CHANGE UP (ref 96–108)
CHLORIDE SERPL-SCNC: 105 MMOL/L — SIGNIFICANT CHANGE UP (ref 96–108)
CO2 SERPL-SCNC: 26 MMOL/L — SIGNIFICANT CHANGE UP (ref 22–31)
CO2 SERPL-SCNC: 26 MMOL/L — SIGNIFICANT CHANGE UP (ref 22–31)
CREAT SERPL-MCNC: 1.29 MG/DL — SIGNIFICANT CHANGE UP (ref 0.5–1.3)
CREAT SERPL-MCNC: 1.29 MG/DL — SIGNIFICANT CHANGE UP (ref 0.5–1.3)
EGFR: 58 ML/MIN/1.73M2 — LOW
EGFR: 58 ML/MIN/1.73M2 — LOW
GLUCOSE SERPL-MCNC: 98 MG/DL — SIGNIFICANT CHANGE UP (ref 70–99)
GLUCOSE SERPL-MCNC: 98 MG/DL — SIGNIFICANT CHANGE UP (ref 70–99)
HCT VFR BLD CALC: 35.6 % — LOW (ref 39–50)
HCT VFR BLD CALC: 35.6 % — LOW (ref 39–50)
HGB BLD-MCNC: 11.5 G/DL — LOW (ref 13–17)
HGB BLD-MCNC: 11.5 G/DL — LOW (ref 13–17)
MAGNESIUM SERPL-MCNC: 1.8 MG/DL — SIGNIFICANT CHANGE UP (ref 1.6–2.6)
MAGNESIUM SERPL-MCNC: 1.8 MG/DL — SIGNIFICANT CHANGE UP (ref 1.6–2.6)
MCHC RBC-ENTMCNC: 29.7 PG — SIGNIFICANT CHANGE UP (ref 27–34)
MCHC RBC-ENTMCNC: 29.7 PG — SIGNIFICANT CHANGE UP (ref 27–34)
MCHC RBC-ENTMCNC: 32.3 GM/DL — SIGNIFICANT CHANGE UP (ref 32–36)
MCHC RBC-ENTMCNC: 32.3 GM/DL — SIGNIFICANT CHANGE UP (ref 32–36)
MCV RBC AUTO: 92 FL — SIGNIFICANT CHANGE UP (ref 80–100)
MCV RBC AUTO: 92 FL — SIGNIFICANT CHANGE UP (ref 80–100)
NRBC # BLD: 0 /100 WBCS — SIGNIFICANT CHANGE UP (ref 0–0)
NRBC # BLD: 0 /100 WBCS — SIGNIFICANT CHANGE UP (ref 0–0)
PLATELET # BLD AUTO: 217 K/UL — SIGNIFICANT CHANGE UP (ref 150–400)
PLATELET # BLD AUTO: 217 K/UL — SIGNIFICANT CHANGE UP (ref 150–400)
POTASSIUM SERPL-MCNC: 4.5 MMOL/L — SIGNIFICANT CHANGE UP (ref 3.5–5.3)
POTASSIUM SERPL-MCNC: 4.5 MMOL/L — SIGNIFICANT CHANGE UP (ref 3.5–5.3)
POTASSIUM SERPL-SCNC: 4.5 MMOL/L — SIGNIFICANT CHANGE UP (ref 3.5–5.3)
POTASSIUM SERPL-SCNC: 4.5 MMOL/L — SIGNIFICANT CHANGE UP (ref 3.5–5.3)
RBC # BLD: 3.87 M/UL — LOW (ref 4.2–5.8)
RBC # BLD: 3.87 M/UL — LOW (ref 4.2–5.8)
RBC # FLD: 14 % — SIGNIFICANT CHANGE UP (ref 10.3–14.5)
RBC # FLD: 14 % — SIGNIFICANT CHANGE UP (ref 10.3–14.5)
SODIUM SERPL-SCNC: 139 MMOL/L — SIGNIFICANT CHANGE UP (ref 135–145)
SODIUM SERPL-SCNC: 139 MMOL/L — SIGNIFICANT CHANGE UP (ref 135–145)
WBC # BLD: 9.82 K/UL — SIGNIFICANT CHANGE UP (ref 3.8–10.5)
WBC # BLD: 9.82 K/UL — SIGNIFICANT CHANGE UP (ref 3.8–10.5)
WBC # FLD AUTO: 9.82 K/UL — SIGNIFICANT CHANGE UP (ref 3.8–10.5)
WBC # FLD AUTO: 9.82 K/UL — SIGNIFICANT CHANGE UP (ref 3.8–10.5)

## 2024-01-14 RX ORDER — LANOLIN ALCOHOL/MO/W.PET/CERES
5 CREAM (GRAM) TOPICAL AT BEDTIME
Refills: 0 | Status: DISCONTINUED | OUTPATIENT
Start: 2024-01-14 | End: 2024-01-14

## 2024-01-14 RX ORDER — MAGNESIUM OXIDE 400 MG ORAL TABLET 241.3 MG
800 TABLET ORAL ONCE
Refills: 0 | Status: COMPLETED | OUTPATIENT
Start: 2024-01-14 | End: 2024-01-14

## 2024-01-14 RX ORDER — BUDESONIDE AND FORMOTEROL FUMARATE DIHYDRATE 160; 4.5 UG/1; UG/1
2 AEROSOL RESPIRATORY (INHALATION)
Refills: 0 | Status: DISCONTINUED | OUTPATIENT
Start: 2024-01-14 | End: 2024-01-17

## 2024-01-14 RX ORDER — LIDOCAINE 4 G/100G
1 CREAM TOPICAL DAILY
Refills: 0 | Status: DISCONTINUED | OUTPATIENT
Start: 2024-01-14 | End: 2024-01-17

## 2024-01-14 RX ORDER — BACITRACIN ZINC 500 UNIT/G
1 OINTMENT IN PACKET (EA) TOPICAL DAILY
Refills: 0 | Status: DISCONTINUED | OUTPATIENT
Start: 2024-01-14 | End: 2024-01-17

## 2024-01-14 RX ORDER — LANOLIN ALCOHOL/MO/W.PET/CERES
5 CREAM (GRAM) TOPICAL AT BEDTIME
Refills: 0 | Status: DISCONTINUED | OUTPATIENT
Start: 2024-01-14 | End: 2024-01-17

## 2024-01-14 RX ADMIN — Medication 1000 MILLIGRAM(S): at 05:35

## 2024-01-14 RX ADMIN — Medication 5 MILLIGRAM(S): at 17:01

## 2024-01-14 RX ADMIN — Medication 1000 MILLIGRAM(S): at 12:30

## 2024-01-14 RX ADMIN — OXYCODONE HYDROCHLORIDE 5 MILLIGRAM(S): 5 TABLET ORAL at 22:27

## 2024-01-14 RX ADMIN — OXYCODONE HYDROCHLORIDE 5 MILLIGRAM(S): 5 TABLET ORAL at 05:36

## 2024-01-14 RX ADMIN — Medication 12.5 MILLIGRAM(S): at 18:03

## 2024-01-14 RX ADMIN — POLYETHYLENE GLYCOL 3350 17 GRAM(S): 17 POWDER, FOR SOLUTION ORAL at 11:30

## 2024-01-14 RX ADMIN — DONEPEZIL HYDROCHLORIDE 5 MILLIGRAM(S): 10 TABLET, FILM COATED ORAL at 22:27

## 2024-01-14 RX ADMIN — LIDOCAINE 1 PATCH: 4 CREAM TOPICAL at 18:00

## 2024-01-14 RX ADMIN — FAMOTIDINE 20 MILLIGRAM(S): 10 INJECTION INTRAVENOUS at 05:36

## 2024-01-14 RX ADMIN — MAGNESIUM OXIDE 400 MG ORAL TABLET 800 MILLIGRAM(S): 241.3 TABLET ORAL at 07:59

## 2024-01-14 RX ADMIN — Medication 1000 MILLIGRAM(S): at 06:30

## 2024-01-14 RX ADMIN — LIDOCAINE 1 PATCH: 4 CREAM TOPICAL at 22:28

## 2024-01-14 RX ADMIN — Medication 1000 MILLIGRAM(S): at 18:00

## 2024-01-14 RX ADMIN — OXYCODONE HYDROCHLORIDE 5 MILLIGRAM(S): 5 TABLET ORAL at 22:34

## 2024-01-14 RX ADMIN — Medication 1000 MILLIGRAM(S): at 17:00

## 2024-01-14 RX ADMIN — OXYCODONE HYDROCHLORIDE 5 MILLIGRAM(S): 5 TABLET ORAL at 16:16

## 2024-01-14 RX ADMIN — SENNA PLUS 2 TABLET(S): 8.6 TABLET ORAL at 22:27

## 2024-01-14 RX ADMIN — BUDESONIDE AND FORMOTEROL FUMARATE DIHYDRATE 2 PUFF(S): 160; 4.5 AEROSOL RESPIRATORY (INHALATION) at 22:35

## 2024-01-14 RX ADMIN — OXYCODONE HYDROCHLORIDE 5 MILLIGRAM(S): 5 TABLET ORAL at 16:46

## 2024-01-14 RX ADMIN — LIDOCAINE 1 PATCH: 4 CREAM TOPICAL at 10:20

## 2024-01-14 RX ADMIN — LIDOCAINE 1 PATCH: 4 CREAM TOPICAL at 10:21

## 2024-01-14 RX ADMIN — Medication 5 MILLIGRAM(S): at 22:28

## 2024-01-14 RX ADMIN — ENOXAPARIN SODIUM 40 MILLIGRAM(S): 100 INJECTION SUBCUTANEOUS at 17:00

## 2024-01-14 RX ADMIN — FAMOTIDINE 20 MILLIGRAM(S): 10 INJECTION INTRAVENOUS at 17:01

## 2024-01-14 RX ADMIN — Medication 1000 MILLIGRAM(S): at 00:00

## 2024-01-14 RX ADMIN — Medication 12.5 MILLIGRAM(S): at 05:37

## 2024-01-14 RX ADMIN — ATORVASTATIN CALCIUM 10 MILLIGRAM(S): 80 TABLET, FILM COATED ORAL at 22:26

## 2024-01-14 RX ADMIN — Medication 5 MILLIGRAM(S): at 05:37

## 2024-01-14 RX ADMIN — Medication 1000 MILLIGRAM(S): at 11:30

## 2024-01-14 RX ADMIN — LIDOCAINE 1 PATCH: 4 CREAM TOPICAL at 05:56

## 2024-01-14 RX ADMIN — OXYCODONE HYDROCHLORIDE 5 MILLIGRAM(S): 5 TABLET ORAL at 06:30

## 2024-01-14 RX ADMIN — Medication 1000 MILLIGRAM(S): at 23:05

## 2024-01-14 RX ADMIN — AMLODIPINE BESYLATE 5 MILLIGRAM(S): 2.5 TABLET ORAL at 05:35

## 2024-01-14 NOTE — PROGRESS NOTE ADULT - SUBJECTIVE AND OBJECTIVE BOX
Patient discussed on morning rounds with Otilia Barronrotaidan    OPERATION & DATE: 1/10/24 -- R VATS RA RUL wedge completion right upper lobectomy MLND     SUBJECTIVE ASSESSMENT: Pt is feeling well this morning, states he is still having trouble sleeping at night. Discussed with patient the possibility of going home with the chest tube vs. staying in the hospital at least a few more days for air leak to resolve and opted for staying in the hospital. Otherwise he is doing well. Denies any chest pain, palpitations, orthopnea, dyspnea on exertion, shortness of breath, wheezing, abd pain, nausea, vomiting, constipation, lightheadedness, headaches, fevers, or chills.    VITAL SIGNS:  Vital Signs Last 24 Hrs  T(C): 36.8 (14 Jan 2024 04:51), Max: 36.8 (14 Jan 2024 04:51)  T(F): 98.2 (14 Jan 2024 04:51), Max: 98.2 (14 Jan 2024 04:51)  HR: 58 (14 Jan 2024 04:45) (58 - 64)  BP: 154/69 (14 Jan 2024 04:45) (130/60 - 154/69)  BP(mean): 99 (14 Jan 2024 04:45) (87 - 103)  RR: 18 (14 Jan 2024 04:45) (17 - 20)  SpO2: 92% (14 Jan 2024 04:45) (92% - 95%)    Parameters below as of 14 Jan 2024 04:45  Patient On (Oxygen Delivery Method): room air      I&O's Detail    13 Jan 2024 07:01  -  14 Jan 2024 07:00  --------------------------------------------------------  IN:    Oral Fluid: 430 mL  Total IN: 430 mL    OUT:    Chest Tube (mL): 110 mL    Voided (mL): 1050 mL  Total OUT: 1160 mL    Total NET: -730 mL    CHEST TUBE:  yes, on waterseal +air leak   PATSY DRAIN:  no  EPICARDIAL WIRES:  no  STITCHES: no  STAPLES: no  DIMAS:  no  CENTRAL LINE: no  MIDLINE/PICC: no  WOUND VAC: no    PHYSICAL EXAM:  General: well appearing sitting in chair in NAD   HEENT: normocephalic, traumatic   Cardio: S1/S2, no murmurs   Pulm: lungs CTA b/l   GI: +Bs 4 quadrants, soft, nontender   Extremities: no edema, no calf tenderness  Vascular: DP 2+ b/l, radial 2+ b/l   Incisions: right VATS incisions without erythema, purulence or ecchymosis.     LABS:             11.5   9.82  )-----------( 217      ( 14 Jan 2024 05:30 )             35.6       01-14    139  |  105  |  20  ----------------------------<  98  4.5   |  26  |  1.29    Ca    9.0      14 Jan 2024 05:30  Mg     1.8     01-14      Urinalysis Basic - ( 14 Jan 2024 05:30 )    Color: x / Appearance: x / SG: x / pH: x  Gluc: 98 mg/dL / Ketone: x  / Bili: x / Urobili: x   Blood: x / Protein: x / Nitrite: x   Leuk Esterase: x / RBC: x / WBC x   Sq Epi: x / Non Sq Epi: x / Bacteria: x    MEDICATIONS  (STANDING):  acetaminophen     Tablet .. 1000 milliGRAM(s) Oral every 6 hours  amLODIPine   Tablet 5 milliGRAM(s) Oral daily  atorvastatin 10 milliGRAM(s) Oral at bedtime  donepezil 5 milliGRAM(s) Oral at bedtime  enoxaparin Injectable 40 milliGRAM(s) SubCutaneous every 24 hours  famotidine    Tablet 20 milliGRAM(s) Oral every 12 hours  lidocaine   4% Patch 1 Patch Transdermal every 24 hours  magnesium oxide 800 milliGRAM(s) Oral once  metoprolol tartrate 12.5 milliGRAM(s) Oral every 12 hours  oxybutynin 5 milliGRAM(s) Oral two times a day  polyethylene glycol 3350 17 Gram(s) Oral daily  senna 2 Tablet(s) Oral at bedtime    MEDICATIONS  (PRN):  ipratropium    for Nebulization 500 MICROGram(s) Nebulizer every 6 hours PRN Shortness of Breath and/or Wheezing  melatonin 5 milliGRAM(s) Oral at bedtime PRN Sleep  oxyCODONE    IR 5 milliGRAM(s) Oral every 4 hours PRN Moderate Pain (4 - 6)    RADIOLOGY & ADDITIONAL TESTS:  < from: Xray Chest 1 View- PORTABLE-Routine (Xray Chest 1 View- PORTABLE-Routine in AM.) (01.13.24 @ 06:30) >  IMPRESSION: No acute infiltrates. Small right apical pneumothorax. Right   chest tube noted  < end of copied text >

## 2024-01-14 NOTE — PROGRESS NOTE ADULT - ASSESSMENT
72 y/o M, former smoker (quit 16 years ago, 45 pack year history), with PMHx of prostate cancer (s/p prostatectomy), COPD, and urinary frequency.  Referred to Dr. Navas for evaluation of pulmonary nodule by Dr. Britta Camacho. Deemed a good candidate for intervention. On 1/10 he underwent an uncomplicated R VATS, RA RUL wedge completion right upper lobectomy and mediastinal lymph node dissection. Postop he was brought to [facility] with 1 CT in place. POD1 CT placed to water seal with stable CXR. POD 2 pt still with large airleak, mild SOB overnight which has resolved. Plan to continue with CT to waterseal. Slight increase in Cr. POD3, pt remains with air leak on waterseal, keeping chest tube to waterseal today. Cr downtrended. POD4, LAYA resolved. CT remains with air leak, keeping to waterseal.     Plan:  Neurovascular:   -Pain well controlled with current regimen. PRN's: acetaminophen, lidocaine, oxycodone  -continue home medication: donezepil     Cardiovascular:   -hx of HTN: continue amlodipine 5, Met Tart 12.5mg Q12 hours   -hx of HLD: continue atorvastatin 10  -Hemodynamically stable.   -Monitor: BP, HR, tele    Respiratory:   -POD4 R VATS RA RUL wedge completion lobectomy, MLND     -1 CT on waterseal, + air leak  -hx of COPD: continue with atrovent PRN   -Oxygenating well on room air  -Encourage continued use of IS 10x/hr and frequent ambulation  -CXR: right chest tube in place, +small air space apical    GI:  -GI PPX: famotidine   -PO Diet: Regular   -Bowel Regimen: miralax, senna    Renal / :  -hx prostate cancer (s/p prostatectomy)  -bladder spasm: continue with oxybutynin bid   -mild LAYA, improving, almost at baseline     -BUN/Cr 20/1.29  -Monitor I/O's daily     Endocrine:    -No hx of DM or thyroid dx  -A1c: 6.0  -TSH: 0.32    Hematologic:  -CBC: H/H- 11/35  -Coagulation Panel.    ID:  -Temperature: afebrile   -CBC: WBC- 9.8  -Continue to observe for SIRS/Sepsis Syndrome.    Prophylaxis:  -DVT prophylaxis with Lovenox 40mg daily   -Continue with SCD's b/l while patient is at rest     Disposition:  -CT management    74 y/o M, former smoker (quit 16 years ago, 45 pack year history), with PMHx of prostate cancer (s/p prostatectomy), COPD, and urinary frequency.  Referred to Dr. Navas for evaluation of pulmonary nodule by Dr. Britta Camacho. Deemed a good candidate for intervention. On 1/10 he underwent an uncomplicated R VATS, RA RUL wedge completion right upper lobectomy and mediastinal lymph node dissection. Postop he was brought to [facility] with 1 CT in place. POD1 CT placed to water seal with stable CXR. POD 2 pt still with large airleak, mild SOB overnight which has resolved. Plan to continue with CT to waterseal. Slight increase in Cr. POD3, pt remains with air leak on waterseal, keeping chest tube to waterseal today. Cr downtrended. POD4, LAYA resolved. CT remains with air leak, keeping to waterseal.     Plan:  Neurovascular:   -Pain well controlled with current regimen. PRN's: acetaminophen, lidocaine, oxycodone  -continue home medication: donezepil     Cardiovascular:   -hx of HTN: continue amlodipine 5, Met Tart 12.5mg Q12 hours   -hx of HLD: continue atorvastatin 10  -Hemodynamically stable.   -Monitor: BP, HR, tele    Respiratory:   -POD4 R VATS RA RUL wedge completion lobectomy, MLND     -1 CT on waterseal, + air leak  -hx of COPD: continue with atrovent PRN   -Oxygenating well on room air  -Encourage continued use of IS 10x/hr and frequent ambulation  -CXR: right chest tube in place, +small air space apical    GI:  -GI PPX: famotidine   -PO Diet: Regular   -Bowel Regimen: miralax, senna    Renal / :  -hx prostate cancer (s/p prostatectomy)  -bladder spasm: continue with oxybutynin bid   -mild LAYA, improving, almost at baseline     -BUN/Cr 20/1.29  -Monitor I/O's daily     Endocrine:    -No hx of DM or thyroid dx  -A1c: 6.0  -TSH: 0.32    Hematologic:  -CBC: H/H- 11/35  -Coagulation Panel.    ID:  -Temperature: afebrile   -CBC: WBC- 9.8  -Continue to observe for SIRS/Sepsis Syndrome.    Prophylaxis:  -DVT prophylaxis with Lovenox 40mg daily   -Continue with SCD's b/l while patient is at rest     Disposition:  -CT management

## 2024-01-15 LAB
ANION GAP SERPL CALC-SCNC: 10 MMOL/L — SIGNIFICANT CHANGE UP (ref 5–17)
ANION GAP SERPL CALC-SCNC: 10 MMOL/L — SIGNIFICANT CHANGE UP (ref 5–17)
BUN SERPL-MCNC: 22 MG/DL — SIGNIFICANT CHANGE UP (ref 7–23)
BUN SERPL-MCNC: 22 MG/DL — SIGNIFICANT CHANGE UP (ref 7–23)
CALCIUM SERPL-MCNC: 9.1 MG/DL — SIGNIFICANT CHANGE UP (ref 8.4–10.5)
CALCIUM SERPL-MCNC: 9.1 MG/DL — SIGNIFICANT CHANGE UP (ref 8.4–10.5)
CHLORIDE SERPL-SCNC: 101 MMOL/L — SIGNIFICANT CHANGE UP (ref 96–108)
CHLORIDE SERPL-SCNC: 101 MMOL/L — SIGNIFICANT CHANGE UP (ref 96–108)
CO2 SERPL-SCNC: 26 MMOL/L — SIGNIFICANT CHANGE UP (ref 22–31)
CO2 SERPL-SCNC: 26 MMOL/L — SIGNIFICANT CHANGE UP (ref 22–31)
CREAT SERPL-MCNC: 1.18 MG/DL — SIGNIFICANT CHANGE UP (ref 0.5–1.3)
CREAT SERPL-MCNC: 1.18 MG/DL — SIGNIFICANT CHANGE UP (ref 0.5–1.3)
EGFR: 65 ML/MIN/1.73M2 — SIGNIFICANT CHANGE UP
EGFR: 65 ML/MIN/1.73M2 — SIGNIFICANT CHANGE UP
GLUCOSE SERPL-MCNC: 110 MG/DL — HIGH (ref 70–99)
GLUCOSE SERPL-MCNC: 110 MG/DL — HIGH (ref 70–99)
HCT VFR BLD CALC: 37.2 % — LOW (ref 39–50)
HCT VFR BLD CALC: 37.2 % — LOW (ref 39–50)
HGB BLD-MCNC: 12 G/DL — LOW (ref 13–17)
HGB BLD-MCNC: 12 G/DL — LOW (ref 13–17)
MAGNESIUM SERPL-MCNC: 2.1 MG/DL — SIGNIFICANT CHANGE UP (ref 1.6–2.6)
MAGNESIUM SERPL-MCNC: 2.1 MG/DL — SIGNIFICANT CHANGE UP (ref 1.6–2.6)
MCHC RBC-ENTMCNC: 30.2 PG — SIGNIFICANT CHANGE UP (ref 27–34)
MCHC RBC-ENTMCNC: 30.2 PG — SIGNIFICANT CHANGE UP (ref 27–34)
MCHC RBC-ENTMCNC: 32.3 GM/DL — SIGNIFICANT CHANGE UP (ref 32–36)
MCHC RBC-ENTMCNC: 32.3 GM/DL — SIGNIFICANT CHANGE UP (ref 32–36)
MCV RBC AUTO: 93.5 FL — SIGNIFICANT CHANGE UP (ref 80–100)
MCV RBC AUTO: 93.5 FL — SIGNIFICANT CHANGE UP (ref 80–100)
NRBC # BLD: 0 /100 WBCS — SIGNIFICANT CHANGE UP (ref 0–0)
NRBC # BLD: 0 /100 WBCS — SIGNIFICANT CHANGE UP (ref 0–0)
PLATELET # BLD AUTO: 245 K/UL — SIGNIFICANT CHANGE UP (ref 150–400)
PLATELET # BLD AUTO: 245 K/UL — SIGNIFICANT CHANGE UP (ref 150–400)
POTASSIUM SERPL-MCNC: 4.9 MMOL/L — SIGNIFICANT CHANGE UP (ref 3.5–5.3)
POTASSIUM SERPL-MCNC: 4.9 MMOL/L — SIGNIFICANT CHANGE UP (ref 3.5–5.3)
POTASSIUM SERPL-SCNC: 4.9 MMOL/L — SIGNIFICANT CHANGE UP (ref 3.5–5.3)
POTASSIUM SERPL-SCNC: 4.9 MMOL/L — SIGNIFICANT CHANGE UP (ref 3.5–5.3)
RBC # BLD: 3.98 M/UL — LOW (ref 4.2–5.8)
RBC # BLD: 3.98 M/UL — LOW (ref 4.2–5.8)
RBC # FLD: 14.1 % — SIGNIFICANT CHANGE UP (ref 10.3–14.5)
RBC # FLD: 14.1 % — SIGNIFICANT CHANGE UP (ref 10.3–14.5)
SODIUM SERPL-SCNC: 137 MMOL/L — SIGNIFICANT CHANGE UP (ref 135–145)
SODIUM SERPL-SCNC: 137 MMOL/L — SIGNIFICANT CHANGE UP (ref 135–145)
WBC # BLD: 9.36 K/UL — SIGNIFICANT CHANGE UP (ref 3.8–10.5)
WBC # BLD: 9.36 K/UL — SIGNIFICANT CHANGE UP (ref 3.8–10.5)
WBC # FLD AUTO: 9.36 K/UL — SIGNIFICANT CHANGE UP (ref 3.8–10.5)
WBC # FLD AUTO: 9.36 K/UL — SIGNIFICANT CHANGE UP (ref 3.8–10.5)

## 2024-01-15 PROCEDURE — 71045 X-RAY EXAM CHEST 1 VIEW: CPT | Mod: 26,77

## 2024-01-15 PROCEDURE — 71045 X-RAY EXAM CHEST 1 VIEW: CPT | Mod: 26,76

## 2024-01-15 RX ORDER — LIDOCAINE HCL 20 MG/ML
20 VIAL (ML) INJECTION ONCE
Refills: 0 | Status: COMPLETED | OUTPATIENT
Start: 2024-01-15 | End: 2024-01-15

## 2024-01-15 RX ORDER — MORPHINE SULFATE 50 MG/1
2 CAPSULE, EXTENDED RELEASE ORAL ONCE
Refills: 0 | Status: DISCONTINUED | OUTPATIENT
Start: 2024-01-15 | End: 2024-01-15

## 2024-01-15 RX ORDER — MORPHINE SULFATE 50 MG/1
4 CAPSULE, EXTENDED RELEASE ORAL ONCE
Refills: 0 | Status: DISCONTINUED | OUTPATIENT
Start: 2024-01-15 | End: 2024-01-15

## 2024-01-15 RX ORDER — HYDROMORPHONE HYDROCHLORIDE 2 MG/ML
0.5 INJECTION INTRAMUSCULAR; INTRAVENOUS; SUBCUTANEOUS ONCE
Refills: 0 | Status: DISCONTINUED | OUTPATIENT
Start: 2024-01-15 | End: 2024-01-15

## 2024-01-15 RX ORDER — FENTANYL CITRATE 50 UG/ML
25 INJECTION INTRAVENOUS ONCE
Refills: 0 | Status: DISCONTINUED | OUTPATIENT
Start: 2024-01-15 | End: 2024-01-15

## 2024-01-15 RX ORDER — METOPROLOL TARTRATE 50 MG
25 TABLET ORAL ONCE
Refills: 0 | Status: COMPLETED | OUTPATIENT
Start: 2024-01-15 | End: 2024-01-15

## 2024-01-15 RX ORDER — ACETAMINOPHEN 500 MG
1000 TABLET ORAL ONCE
Refills: 0 | Status: COMPLETED | OUTPATIENT
Start: 2024-01-15 | End: 2024-01-15

## 2024-01-15 RX ADMIN — Medication 0.5 MILLIGRAM(S): at 16:21

## 2024-01-15 RX ADMIN — LIDOCAINE 1 PATCH: 4 CREAM TOPICAL at 18:00

## 2024-01-15 RX ADMIN — FENTANYL CITRATE 25 MICROGRAM(S): 50 INJECTION INTRAVENOUS at 15:42

## 2024-01-15 RX ADMIN — AMLODIPINE BESYLATE 5 MILLIGRAM(S): 2.5 TABLET ORAL at 05:10

## 2024-01-15 RX ADMIN — ATORVASTATIN CALCIUM 10 MILLIGRAM(S): 80 TABLET, FILM COATED ORAL at 22:39

## 2024-01-15 RX ADMIN — Medication 400 MILLIGRAM(S): at 15:55

## 2024-01-15 RX ADMIN — FAMOTIDINE 20 MILLIGRAM(S): 10 INJECTION INTRAVENOUS at 17:32

## 2024-01-15 RX ADMIN — OXYCODONE HYDROCHLORIDE 5 MILLIGRAM(S): 5 TABLET ORAL at 11:03

## 2024-01-15 RX ADMIN — FENTANYL CITRATE 25 MICROGRAM(S): 50 INJECTION INTRAVENOUS at 16:10

## 2024-01-15 RX ADMIN — FAMOTIDINE 20 MILLIGRAM(S): 10 INJECTION INTRAVENOUS at 05:10

## 2024-01-15 RX ADMIN — MORPHINE SULFATE 2 MILLIGRAM(S): 50 CAPSULE, EXTENDED RELEASE ORAL at 17:00

## 2024-01-15 RX ADMIN — Medication 1000 MILLIGRAM(S): at 11:03

## 2024-01-15 RX ADMIN — HYDROMORPHONE HYDROCHLORIDE 0.5 MILLIGRAM(S): 2 INJECTION INTRAMUSCULAR; INTRAVENOUS; SUBCUTANEOUS at 14:50

## 2024-01-15 RX ADMIN — Medication 5 MILLIGRAM(S): at 05:10

## 2024-01-15 RX ADMIN — HYDROMORPHONE HYDROCHLORIDE 0.5 MILLIGRAM(S): 2 INJECTION INTRAMUSCULAR; INTRAVENOUS; SUBCUTANEOUS at 15:00

## 2024-01-15 RX ADMIN — Medication 1000 MILLIGRAM(S): at 23:39

## 2024-01-15 RX ADMIN — LIDOCAINE 1 PATCH: 4 CREAM TOPICAL at 22:52

## 2024-01-15 RX ADMIN — HYDROMORPHONE HYDROCHLORIDE 0.5 MILLIGRAM(S): 2 INJECTION INTRAMUSCULAR; INTRAVENOUS; SUBCUTANEOUS at 15:58

## 2024-01-15 RX ADMIN — Medication 1 APPLICATION(S): at 11:09

## 2024-01-15 RX ADMIN — Medication 1000 MILLIGRAM(S): at 16:10

## 2024-01-15 RX ADMIN — Medication 1000 MILLIGRAM(S): at 12:00

## 2024-01-15 RX ADMIN — Medication 12.5 MILLIGRAM(S): at 06:35

## 2024-01-15 RX ADMIN — Medication 5 MILLIGRAM(S): at 17:32

## 2024-01-15 RX ADMIN — Medication 5 MILLIGRAM(S): at 22:39

## 2024-01-15 RX ADMIN — OXYCODONE HYDROCHLORIDE 5 MILLIGRAM(S): 5 TABLET ORAL at 23:39

## 2024-01-15 RX ADMIN — ENOXAPARIN SODIUM 40 MILLIGRAM(S): 100 INJECTION SUBCUTANEOUS at 17:31

## 2024-01-15 RX ADMIN — OXYCODONE HYDROCHLORIDE 5 MILLIGRAM(S): 5 TABLET ORAL at 22:39

## 2024-01-15 RX ADMIN — Medication 20 MILLILITER(S): at 14:50

## 2024-01-15 RX ADMIN — MORPHINE SULFATE 2 MILLIGRAM(S): 50 CAPSULE, EXTENDED RELEASE ORAL at 17:15

## 2024-01-15 RX ADMIN — HYDROMORPHONE HYDROCHLORIDE 0.5 MILLIGRAM(S): 2 INJECTION INTRAMUSCULAR; INTRAVENOUS; SUBCUTANEOUS at 15:43

## 2024-01-15 RX ADMIN — Medication 1000 MILLIGRAM(S): at 05:11

## 2024-01-15 RX ADMIN — DONEPEZIL HYDROCHLORIDE 5 MILLIGRAM(S): 10 TABLET, FILM COATED ORAL at 22:39

## 2024-01-15 RX ADMIN — BUDESONIDE AND FORMOTEROL FUMARATE DIHYDRATE 2 PUFF(S): 160; 4.5 AEROSOL RESPIRATORY (INHALATION) at 09:30

## 2024-01-15 RX ADMIN — Medication 500 MILLIGRAM(S): at 15:13

## 2024-01-15 RX ADMIN — Medication 25 MILLIGRAM(S): at 16:51

## 2024-01-15 RX ADMIN — FENTANYL CITRATE 25 MICROGRAM(S): 50 INJECTION INTRAVENOUS at 16:00

## 2024-01-15 RX ADMIN — FENTANYL CITRATE 25 MICROGRAM(S): 50 INJECTION INTRAVENOUS at 15:55

## 2024-01-15 RX ADMIN — Medication 1000 MILLIGRAM(S): at 23:00

## 2024-01-15 RX ADMIN — OXYCODONE HYDROCHLORIDE 5 MILLIGRAM(S): 5 TABLET ORAL at 12:00

## 2024-01-15 RX ADMIN — LIDOCAINE 1 PATCH: 4 CREAM TOPICAL at 11:03

## 2024-01-15 RX ADMIN — POLYETHYLENE GLYCOL 3350 17 GRAM(S): 17 POWDER, FOR SOLUTION ORAL at 11:03

## 2024-01-15 RX ADMIN — LIDOCAINE 1 PATCH: 4 CREAM TOPICAL at 11:11

## 2024-01-15 RX ADMIN — Medication 1000 MILLIGRAM(S): at 05:34

## 2024-01-15 NOTE — PROGRESS NOTE ADULT - ASSESSMENT
74 y/o M, former smoker (quit 16 years ago, 45 pack year history), with PMHx of prostate cancer (s/p prostatectomy), COPD, and urinary frequency.  Referred to Dr. Navas for evaluation of pulmonary nodule by Dr. Britta Camacho. Deemed a good candidate for intervention. On 1/10 he underwent an uncomplicated R VATS, RA RUL wedge completion right upper lobectomy and mediastinal lymph node dissection. Postop he was brought to [facility] with 1 CT in place. POD1 CT placed to water seal with stable CXR. POD 2 pt still with large airleak, mild SOB overnight which has resolved. Plan to continue with CT to waterseal. Slight increase in Cr. POD3, pt remains with air leak on waterseal, keeping chest tube to waterseal today. Cr downtrended. POD4, LAYA resolved. CT remains with air leak, keeping to waterseal. POD 5 pt still with airleak v airspace-plan for clamp trial today with possible CT removal.     Plan:    Neurovascular:   -Pain well controlled with current regimen. PRN's: tylenol, oxycodone    Cardiovascular:   -Hemodynamically stable.   -Monitor: BP, HR, tele  -HTN    -c/w metoprolol 12.5 BID    -c/w norvasc 5  -HLD    -c/w lipitor    Respiratory:   -Oxygenating well on room air  -Encourage continued use of IS 10x/hr and frequent ambulation  -CXR: WNL, no ptx  -CT management    -clamp trial-repeat cxr at 1:30    GI:  -GI PPX: pepcid  -PO Diet  -Bowel Regimen: miralax, senna    Renal / :  -Continue to monitor renal function: BUN/Cr 22/1.18  -Monitor I/O's daily     Endocrine:    -No hx of DM or thyroid dx  -A1c: 6  -TSH: .,322    Hematologic:  -CBC: H/H- 12/37  -Coagulation Panel.    ID:  -Temperature: 37  -CBC: WBC- 9.3  -Continue to observe for SIRS/Sepsis Syndrome.    Prophylaxis:  -DVT prophylaxis with lovenox  -Continue with SCD's b/l while patient is at rest     Disposition:  -Discharge home once patient is medically ready pending clamp trial   74 y/o M, former smoker (quit 16 years ago, 45 pack year history), with PMHx of prostate cancer (s/p prostatectomy), COPD, and urinary frequency.  Referred to Dr. Navas for evaluation of pulmonary nodule by Dr. Britta Camacho. Deemed a good candidate for intervention. On 1/10 he underwent an uncomplicated R VATS, RA RUL wedge completion right upper lobectomy and mediastinal lymph node dissection. Postop he was brought to [facility] with 1 CT in place. POD1 CT placed to water seal with stable CXR. POD 2 pt still with large airleak, mild SOB overnight which has resolved. Plan to continue with CT to waterseal. Slight increase in Cr. POD3, pt remains with air leak on waterseal, keeping chest tube to waterseal today. Cr downtrended. POD4, LAYA resolved. CT remains with air leak, keeping to waterseal. POD 5 pt still with airleak v airspace-plan for clamp trial today with possible CT removal.     Plan:    Neurovascular:   -Pain well controlled with current regimen. PRN's: tylenol, oxycodone    Cardiovascular:   -Hemodynamically stable.   -Monitor: BP, HR, tele  -HTN    -c/w metoprolol 12.5 BID    -c/w norvasc 5  -HLD    -c/w lipitor    Respiratory:   -Oxygenating well on room air  -Encourage continued use of IS 10x/hr and frequent ambulation  -CXR: WNL, no ptx  -CT management    -clamp trial-repeat cxr at 1:30    GI:  -GI PPX: pepcid  -PO Diet  -Bowel Regimen: miralax, senna    Renal / :  -Continue to monitor renal function: BUN/Cr 22/1.18  -Monitor I/O's daily     Endocrine:    -No hx of DM or thyroid dx  -A1c: 6  -TSH: .,322    Hematologic:  -CBC: H/H- 12/37  -Coagulation Panel.    ID:  -Temperature: 37  -CBC: WBC- 9.3  -Continue to observe for SIRS/Sepsis Syndrome.    Prophylaxis:  -DVT prophylaxis with lovenox  -Continue with SCD's b/l while patient is at rest     Disposition:  -Discharge home once patient is medically ready pending clamp trial   72 y/o M, former smoker (quit 16 years ago, 45 pack year history), with PMHx of prostate cancer (s/p prostatectomy), COPD, and urinary frequency.  Referred to Dr. Navas for evaluation of pulmonary nodule by Dr. Britta Camacho. Deemed a good candidate for intervention. On 1/10 he underwent an uncomplicated R VATS, RA RUL wedge completion right upper lobectomy and mediastinal lymph node dissection. Postop he was brought to [facility] with 1 CT in place. POD1 CT placed to water seal with stable CXR. POD 2 pt still with large airleak, mild SOB overnight which has resolved. Plan to continue with CT to waterseal. Slight increase in Cr. POD3, pt remains with air leak on waterseal, keeping chest tube to waterseal today. Cr downtrended. POD4, LAYA resolved. CT remains with air leak, keeping to waterseal. POD 5 pt still with airleak v airspace-plan for clamp trial today with possible CT removal.     Plan:    Neurovascular:   -Pain well controlled with current regimen. PRN's: tylenol, oxycodone    Cardiovascular:   -Hemodynamically stable.   -Monitor: BP, HR, tele  -HTN    -c/w metoprolol 12.5 BID    -c/w norvasc 5  -HLD    -c/w lipitor    Respiratory:   -Oxygenating well on room air  -Encourage continued use of IS 10x/hr and frequent ambulation  -CXR: WNL, no ptx  -CT management    -clamp trial-repeat cxr at 1:30    GI:  -GI PPX: pepcid  -PO Diet  -Bowel Regimen: miralax, senna    Renal / :  -Continue to monitor renal function: BUN/Cr 22/1.18  -Monitor I/O's daily     Endocrine:    -No hx of DM or thyroid dx  -A1c: 6  -TSH: .,322    Hematologic:  -CBC: H/H- 12/37  -Coagulation Panel.    ID:  -Temperature: 37  -CBC: WBC- 9.3  -Continue to observe for SIRS/Sepsis Syndrome.    Prophylaxis:  -DVT prophylaxis with lovenox  -Continue with SCD's b/l while patient is at rest     Disposition:  -Discharge home once patient is medically ready pending clamp trial

## 2024-01-15 NOTE — PROGRESS NOTE ADULT - SUBJECTIVE AND OBJECTIVE BOX
Patient discussed on morning rounds with Dr. Navas    OPERATION & DATE: 1/10 RVATS, RUL wedge, completion R upper lobectomy, MLND    SUBJECTIVE ASSESSMENT: resting comfortably in bed. Reports pain from CT site which is largely unchanged.     VITAL SIGNS:  Vital Signs Last 24 Hrs  T(C): 37.1 (15 Joaquim 2024 08:57), Max: 37.1 (15 Joaquim 2024 08:57)  T(F): 98.8 (15 Joaquim 2024 08:57), Max: 98.8 (15 Joaquim 2024 08:57)  HR: 58 (15 Joaquim 2024 08:25) (58 - 73)  BP: 144/67 (15 Joaquim 2024 08:25) (137/61 - 165/72)  BP(mean): 96 (15 Joaquim 2024 08:25) (88 - 103)  RR: 16 (15 Joaquim 2024 08:25) (16 - 18)  SpO2: 95% (15 Joaquim 2024 08:25) (91% - 95%)    Parameters below as of 15 Joaquim 2024 08:25  Patient On (Oxygen Delivery Method): room air      I&O's Detail    14 Jan 2024 07:01  -  15 Joaquim 2024 07:00  --------------------------------------------------------  IN:    Oral Fluid: 640 mL  Total IN: 640 mL    OUT:    Chest Tube (mL): 100 mL    Voided (mL): 1310 mL  Total OUT: 1410 mL    Total NET: -770 mL      15 Joaquim 2024 07:01  -  15 Joaquim 2024 12:10  --------------------------------------------------------  IN:  Total IN: 0 mL    OUT:    Chest Tube (mL): 50 mL    Voided (mL): 400 mL  Total OUT: 450 mL    Total NET: -450 mL        CHEST TUBE:  R sided CT to waterseal with airleak v airspace  PATSY DRAIN:  none  EPICARDIAL WIRES: none  STITCHES: none  STAPLES: none  DIMAS: none  CENTRAL LINE: none  MIDLINE/PICC: none  WOUND VAC: none    PHYSICAL EXAM:  General: resting comfortably in bed in NAD  Neurological: AOx3. Motor skills grossly intact  Cardiovascular: Normal S1/S2. Regular rate/rhythm. No murmurs  Respiratory: Lungs CTA bilaterally. No wheezing or rales  Gastrointestinal: +BS in all 4 quadrants. Non-distended. Soft. Non-tender  Extremities: Strength 5/5 b/l upper/lower extremities. Sensation grossly intact upper/lower extremities. No edema. No calf tenderness.  Vascular: Radial 2+bilaterally, DP 2+ b/l  Incision Sites: VATS incisions clean, dry, intact    LABS:                        12.0   9.36  )-----------( 245      ( 15 Joaquim 2024 05:30 )             37.2       01-15    137  |  101  |  22  ----------------------------<  110<H>  4.9   |  26  |  1.18    Ca    9.1      15 Joaquim 2024 05:30  Mg     2.1     01-15      Urinalysis Basic - ( 15 Joaquim 2024 05:30 )    Color: x / Appearance: x / SG: x / pH: x  Gluc: 110 mg/dL / Ketone: x  / Bili: x / Urobili: x   Blood: x / Protein: x / Nitrite: x   Leuk Esterase: x / RBC: x / WBC x   Sq Epi: x / Non Sq Epi: x / Bacteria: x      MEDICATIONS  (STANDING):  acetaminophen     Tablet .. 1000 milliGRAM(s) Oral every 6 hours  amLODIPine   Tablet 5 milliGRAM(s) Oral daily  atorvastatin 10 milliGRAM(s) Oral at bedtime  bacitracin   Ointment 1 Application(s) Topical daily  budesonide  80 MICROgram(s)/formoterol 4.5 MICROgram(s) Inhaler 2 Puff(s) Inhalation two times a day  donepezil 5 milliGRAM(s) Oral at bedtime  enoxaparin Injectable 40 milliGRAM(s) SubCutaneous every 24 hours  famotidine    Tablet 20 milliGRAM(s) Oral every 12 hours  lidocaine   4% Patch 1 Patch Transdermal daily  lidocaine   4% Patch 1 Patch Transdermal every 24 hours  melatonin 5 milliGRAM(s) Oral at bedtime  metoprolol tartrate 12.5 milliGRAM(s) Oral every 12 hours  oxybutynin 5 milliGRAM(s) Oral two times a day  polyethylene glycol 3350 17 Gram(s) Oral daily  senna 2 Tablet(s) Oral at bedtime    MEDICATIONS  (PRN):  ipratropium    for Nebulization 500 MICROGram(s) Nebulizer every 6 hours PRN Shortness of Breath and/or Wheezing  oxyCODONE    IR 5 milliGRAM(s) Oral every 4 hours PRN Moderate Pain (4 - 6)    RADIOLOGY & ADDITIONAL TESTS:

## 2024-01-16 LAB
ANION GAP SERPL CALC-SCNC: 13 MMOL/L — SIGNIFICANT CHANGE UP (ref 5–17)
BUN SERPL-MCNC: 24 MG/DL — HIGH (ref 7–23)
CALCIUM SERPL-MCNC: 9 MG/DL — SIGNIFICANT CHANGE UP (ref 8.4–10.5)
CHLORIDE SERPL-SCNC: 102 MMOL/L — SIGNIFICANT CHANGE UP (ref 96–108)
CO2 SERPL-SCNC: 22 MMOL/L — SIGNIFICANT CHANGE UP (ref 22–31)
CREAT SERPL-MCNC: 1.15 MG/DL — SIGNIFICANT CHANGE UP (ref 0.5–1.3)
EGFR: 67 ML/MIN/1.73M2 — SIGNIFICANT CHANGE UP
GLUCOSE SERPL-MCNC: 105 MG/DL — HIGH (ref 70–99)
HCT VFR BLD CALC: 36.1 % — LOW (ref 39–50)
HGB BLD-MCNC: 11.7 G/DL — LOW (ref 13–17)
MAGNESIUM SERPL-MCNC: 2.1 MG/DL — SIGNIFICANT CHANGE UP (ref 1.6–2.6)
MCHC RBC-ENTMCNC: 30.1 PG — SIGNIFICANT CHANGE UP (ref 27–34)
MCHC RBC-ENTMCNC: 32.4 GM/DL — SIGNIFICANT CHANGE UP (ref 32–36)
MCV RBC AUTO: 92.8 FL — SIGNIFICANT CHANGE UP (ref 80–100)
NRBC # BLD: 0 /100 WBCS — SIGNIFICANT CHANGE UP (ref 0–0)
PLATELET # BLD AUTO: 260 K/UL — SIGNIFICANT CHANGE UP (ref 150–400)
POTASSIUM SERPL-MCNC: 5.1 MMOL/L — SIGNIFICANT CHANGE UP (ref 3.5–5.3)
POTASSIUM SERPL-SCNC: 5.1 MMOL/L — SIGNIFICANT CHANGE UP (ref 3.5–5.3)
RBC # BLD: 3.89 M/UL — LOW (ref 4.2–5.8)
RBC # FLD: 13.8 % — SIGNIFICANT CHANGE UP (ref 10.3–14.5)
SODIUM SERPL-SCNC: 137 MMOL/L — SIGNIFICANT CHANGE UP (ref 135–145)
WBC # BLD: 10.37 K/UL — SIGNIFICANT CHANGE UP (ref 3.8–10.5)
WBC # FLD AUTO: 10.37 K/UL — SIGNIFICANT CHANGE UP (ref 3.8–10.5)

## 2024-01-16 PROCEDURE — 71045 X-RAY EXAM CHEST 1 VIEW: CPT | Mod: 26

## 2024-01-16 RX ADMIN — FAMOTIDINE 20 MILLIGRAM(S): 10 INJECTION INTRAVENOUS at 17:05

## 2024-01-16 RX ADMIN — ATORVASTATIN CALCIUM 10 MILLIGRAM(S): 80 TABLET, FILM COATED ORAL at 21:38

## 2024-01-16 RX ADMIN — LIDOCAINE 1 PATCH: 4 CREAM TOPICAL at 23:24

## 2024-01-16 RX ADMIN — Medication 1000 MILLIGRAM(S): at 06:58

## 2024-01-16 RX ADMIN — LIDOCAINE 1 PATCH: 4 CREAM TOPICAL at 11:58

## 2024-01-16 RX ADMIN — Medication 5 MILLIGRAM(S): at 06:58

## 2024-01-16 RX ADMIN — SENNA PLUS 2 TABLET(S): 8.6 TABLET ORAL at 21:41

## 2024-01-16 RX ADMIN — BUDESONIDE AND FORMOTEROL FUMARATE DIHYDRATE 2 PUFF(S): 160; 4.5 AEROSOL RESPIRATORY (INHALATION) at 11:30

## 2024-01-16 RX ADMIN — ENOXAPARIN SODIUM 40 MILLIGRAM(S): 100 INJECTION SUBCUTANEOUS at 17:04

## 2024-01-16 RX ADMIN — FAMOTIDINE 20 MILLIGRAM(S): 10 INJECTION INTRAVENOUS at 06:58

## 2024-01-16 RX ADMIN — Medication 5 MILLIGRAM(S): at 21:38

## 2024-01-16 RX ADMIN — OXYCODONE HYDROCHLORIDE 5 MILLIGRAM(S): 5 TABLET ORAL at 07:42

## 2024-01-16 RX ADMIN — POLYETHYLENE GLYCOL 3350 17 GRAM(S): 17 POWDER, FOR SOLUTION ORAL at 11:58

## 2024-01-16 RX ADMIN — OXYCODONE HYDROCHLORIDE 5 MILLIGRAM(S): 5 TABLET ORAL at 06:58

## 2024-01-16 RX ADMIN — Medication 1 APPLICATION(S): at 12:14

## 2024-01-16 RX ADMIN — Medication 12.5 MILLIGRAM(S): at 21:38

## 2024-01-16 RX ADMIN — Medication 1000 MILLIGRAM(S): at 11:58

## 2024-01-16 RX ADMIN — Medication 12.5 MILLIGRAM(S): at 08:37

## 2024-01-16 RX ADMIN — Medication 5 MILLIGRAM(S): at 17:04

## 2024-01-16 RX ADMIN — Medication 1000 MILLIGRAM(S): at 07:42

## 2024-01-16 RX ADMIN — Medication 1000 MILLIGRAM(S): at 12:40

## 2024-01-16 RX ADMIN — Medication 1000 MILLIGRAM(S): at 17:04

## 2024-01-16 RX ADMIN — AMLODIPINE BESYLATE 5 MILLIGRAM(S): 2.5 TABLET ORAL at 06:58

## 2024-01-16 RX ADMIN — DONEPEZIL HYDROCHLORIDE 5 MILLIGRAM(S): 10 TABLET, FILM COATED ORAL at 21:38

## 2024-01-16 NOTE — DIETITIAN INITIAL EVALUATION ADULT - PERTINENT LABORATORY DATA
01-16    137  |  102  |  24<H>  ----------------------------<  105<H>  5.1   |  22  |  1.15    Ca    9.0      16 Jan 2024 05:30  Mg     2.1     01-16    A1C with Estimated Average Glucose Result: 6.0 % (01-11-24 @ 05:30)

## 2024-01-16 NOTE — PROGRESS NOTE ADULT - PROVIDER SPECIALTY LIST ADULT
Thoracic Surgery
Thoracic Surgery
CT Surgery
Thoracic Surgery

## 2024-01-16 NOTE — DIETITIAN INITIAL EVALUATION ADULT - OTHER CALCULATIONS
Estimated needs based on IBW as dosing wt 183lb/83.1kg >120% IBW (141%). Needs adjusted for age, BMI, COPD, and status post OR.

## 2024-01-16 NOTE — DIETITIAN INITIAL EVALUATION ADULT - PERTINENT MEDS FT
MEDICATIONS  (STANDING):  acetaminophen     Tablet .. 1000 milliGRAM(s) Oral every 6 hours  amLODIPine   Tablet 5 milliGRAM(s) Oral daily  atorvastatin 10 milliGRAM(s) Oral at bedtime  bacitracin   Ointment 1 Application(s) Topical daily  budesonide  80 MICROgram(s)/formoterol 4.5 MICROgram(s) Inhaler 2 Puff(s) Inhalation two times a day  donepezil 5 milliGRAM(s) Oral at bedtime  enoxaparin Injectable 40 milliGRAM(s) SubCutaneous every 24 hours  famotidine    Tablet 20 milliGRAM(s) Oral every 12 hours  lidocaine   4% Patch 1 Patch Transdermal daily  lidocaine   4% Patch 1 Patch Transdermal every 24 hours  melatonin 5 milliGRAM(s) Oral at bedtime  metoprolol tartrate 12.5 milliGRAM(s) Oral every 12 hours  oxybutynin 5 milliGRAM(s) Oral two times a day  polyethylene glycol 3350 17 Gram(s) Oral daily  senna 2 Tablet(s) Oral at bedtime    MEDICATIONS  (PRN):  ipratropium    for Nebulization 500 MICROGram(s) Nebulizer every 6 hours PRN Shortness of Breath and/or Wheezing  oxyCODONE    IR 5 milliGRAM(s) Oral every 4 hours PRN Moderate Pain (4 - 6)

## 2024-01-16 NOTE — PROGRESS NOTE ADULT - SUBJECTIVE AND OBJECTIVE BOX
Patient discussed on morning rounds with Dr. Navas    OPERATION & DATE: 1/10 RVATS, RA, RUL wedge completion right upper lobectomy, MLND    SUBJECTIVE ASSESSMENT: resting comfortably in in bed. Pt reports improvement of pain since yesterday after the doxy pleurodesis.     VITAL SIGNS:  Vital Signs Last 24 Hrs  T(C): 36.9 (16 Jan 2024 05:23), Max: 37.2 (16 Jan 2024 01:07)  T(F): 98.5 (16 Jan 2024 05:23), Max: 98.9 (16 Jan 2024 01:07)  HR: 65 (16 Jan 2024 06:48) (55 - 84)  BP: 174/73 (16 Jan 2024 06:48) (134/63 - 202/91)  BP(mean): 105 (16 Jan 2024 06:48) (90 - 131)  RR: 16 (16 Jan 2024 06:48) (16 - 18)  SpO2: 96% (16 Jan 2024 06:48) (91% - 96%)    Parameters below as of 16 Jan 2024 06:48  Patient On (Oxygen Delivery Method): room air      I&O's Detail    15 Joaquim 2024 07:01  -  16 Jan 2024 07:00  --------------------------------------------------------  IN:    IV PiggyBack: 100 mL    Oral Fluid: 400 mL  Total IN: 500 mL    OUT:    Chest Tube (mL): 300 mL    Voided (mL): 2250 mL  Total OUT: 2550 mL    Total NET: -2050 mL        CHEST TUBE:  1 L sided CT in place to suction-no airleak today  PATSY DRAIN:  none  EPICARDIAL WIRES: none  STITCHES: none  STAPLES: none  DIMAS:  none  CENTRAL LINE: none   MIDLINE/PICC: none  WOUND VAC: none    PHYSICAL EXAM:  General: resting comfortably in bed in NAD  Neurological: AOx3. Motor skills grossly intact  Cardiovascular: Normal S1/S2. Regular rate/rhythm. No murmurs  Respiratory: Lungs CTA bilaterally. No wheezing or rales  Gastrointestinal: +BS in all 4 quadrants. Non-distended. Soft. Non-tender  Extremities: Strength 5/5 b/l upper/lower extremities. Sensation grossly intact upper/lower extremities. No edema. No calf tenderness.  Vascular: Radial 2+bilaterally, DP 2+ b/l  Incision Sites: VATS incisions clean, try, intact    LABS:                        11.7   10.37 )-----------( 260      ( 16 Jan 2024 05:30 )             36.1       01-16    137  |  102  |  24<H>  ----------------------------<  105<H>  5.1   |  22  |  1.15    Ca    9.0      16 Jan 2024 05:30  Mg     2.1     01-16      Urinalysis Basic - ( 16 Jan 2024 05:30 )    Color: x / Appearance: x / SG: x / pH: x  Gluc: 105 mg/dL / Ketone: x  / Bili: x / Urobili: x   Blood: x / Protein: x / Nitrite: x   Leuk Esterase: x / RBC: x / WBC x   Sq Epi: x / Non Sq Epi: x / Bacteria: x      MEDICATIONS  (STANDING):  acetaminophen     Tablet .. 1000 milliGRAM(s) Oral every 6 hours  amLODIPine   Tablet 5 milliGRAM(s) Oral daily  atorvastatin 10 milliGRAM(s) Oral at bedtime  bacitracin   Ointment 1 Application(s) Topical daily  budesonide  80 MICROgram(s)/formoterol 4.5 MICROgram(s) Inhaler 2 Puff(s) Inhalation two times a day  donepezil 5 milliGRAM(s) Oral at bedtime  enoxaparin Injectable 40 milliGRAM(s) SubCutaneous every 24 hours  famotidine    Tablet 20 milliGRAM(s) Oral every 12 hours  lidocaine   4% Patch 1 Patch Transdermal daily  lidocaine   4% Patch 1 Patch Transdermal every 24 hours  melatonin 5 milliGRAM(s) Oral at bedtime  metoprolol tartrate 12.5 milliGRAM(s) Oral every 12 hours  oxybutynin 5 milliGRAM(s) Oral two times a day  polyethylene glycol 3350 17 Gram(s) Oral daily  senna 2 Tablet(s) Oral at bedtime    MEDICATIONS  (PRN):  ipratropium    for Nebulization 500 MICROGram(s) Nebulizer every 6 hours PRN Shortness of Breath and/or Wheezing  oxyCODONE    IR 5 milliGRAM(s) Oral every 4 hours PRN Moderate Pain (4 - 6)    RADIOLOGY & ADDITIONAL TESTS:       Patient discussed on morning rounds with Dr. aNvas    OPERATION & DATE: 1/10 RVATS, RA, RUL wedge completion right upper lobectomy, MLND    SUBJECTIVE ASSESSMENT: resting comfortably in in bed. Pt reports improvement of pain since yesterday after the doxy pleurodesis.     VITAL SIGNS:  Vital Signs Last 24 Hrs  T(C): 36.9 (16 Jan 2024 05:23), Max: 37.2 (16 Jan 2024 01:07)  T(F): 98.5 (16 Jan 2024 05:23), Max: 98.9 (16 Jan 2024 01:07)  HR: 65 (16 Jan 2024 06:48) (55 - 84)  BP: 174/73 (16 Jan 2024 06:48) (134/63 - 202/91)  BP(mean): 105 (16 Jan 2024 06:48) (90 - 131)  RR: 16 (16 Jan 2024 06:48) (16 - 18)  SpO2: 96% (16 Jan 2024 06:48) (91% - 96%)    Parameters below as of 16 Jan 2024 06:48  Patient On (Oxygen Delivery Method): room air      I&O's Detail    15 Joaquim 2024 07:01  -  16 Jan 2024 07:00  --------------------------------------------------------  IN:    IV PiggyBack: 100 mL    Oral Fluid: 400 mL  Total IN: 500 mL    OUT:    Chest Tube (mL): 300 mL    Voided (mL): 2250 mL  Total OUT: 2550 mL    Total NET: -2050 mL        CHEST TUBE:  1 L sided CT in place to suction-no airleak today  PATSY DRAIN:  none  EPICARDIAL WIRES: none  STITCHES: none  STAPLES: none  DIMAS:  none  CENTRAL LINE: none   MIDLINE/PICC: none  WOUND VAC: none    PHYSICAL EXAM:  General: resting comfortably in bed in NAD  Neurological: AOx3. Motor skills grossly intact  Cardiovascular: Normal S1/S2. Regular rate/rhythm. No murmurs  Respiratory: Lungs CTA bilaterally. No wheezing or rales  Gastrointestinal: +BS in all 4 quadrants. Non-distended. Soft. Non-tender  Extremities: Strength 5/5 b/l upper/lower extremities. Sensation grossly intact upper/lower extremities. No edema. No calf tenderness.  Vascular: Radial 2+bilaterally, DP 2+ b/l  Incision Sites: VATS incisions clean, try, intact    LABS:                        11.7   10.37 )-----------( 260      ( 16 Jan 2024 05:30 )             36.1       01-16    137  |  102  |  24<H>  ----------------------------<  105<H>  5.1   |  22  |  1.15    Ca    9.0      16 Jan 2024 05:30  Mg     2.1     01-16      Urinalysis Basic - ( 16 Jan 2024 05:30 )    Color: x / Appearance: x / SG: x / pH: x  Gluc: 105 mg/dL / Ketone: x  / Bili: x / Urobili: x   Blood: x / Protein: x / Nitrite: x   Leuk Esterase: x / RBC: x / WBC x   Sq Epi: x / Non Sq Epi: x / Bacteria: x      MEDICATIONS  (STANDING):  acetaminophen     Tablet .. 1000 milliGRAM(s) Oral every 6 hours  amLODIPine   Tablet 5 milliGRAM(s) Oral daily  atorvastatin 10 milliGRAM(s) Oral at bedtime  bacitracin   Ointment 1 Application(s) Topical daily  budesonide  80 MICROgram(s)/formoterol 4.5 MICROgram(s) Inhaler 2 Puff(s) Inhalation two times a day  donepezil 5 milliGRAM(s) Oral at bedtime  enoxaparin Injectable 40 milliGRAM(s) SubCutaneous every 24 hours  famotidine    Tablet 20 milliGRAM(s) Oral every 12 hours  lidocaine   4% Patch 1 Patch Transdermal daily  lidocaine   4% Patch 1 Patch Transdermal every 24 hours  melatonin 5 milliGRAM(s) Oral at bedtime  metoprolol tartrate 12.5 milliGRAM(s) Oral every 12 hours  oxybutynin 5 milliGRAM(s) Oral two times a day  polyethylene glycol 3350 17 Gram(s) Oral daily  senna 2 Tablet(s) Oral at bedtime    MEDICATIONS  (PRN):  ipratropium    for Nebulization 500 MICROGram(s) Nebulizer every 6 hours PRN Shortness of Breath and/or Wheezing  oxyCODONE    IR 5 milliGRAM(s) Oral every 4 hours PRN Moderate Pain (4 - 6)    RADIOLOGY & ADDITIONAL TESTS:

## 2024-01-16 NOTE — PROGRESS NOTE ADULT - REASON FOR ADMISSION
Right Robotic Assisted/VATS RUL wedge, possible lobectomy, possible thoracotomy on 1/10/24

## 2024-01-16 NOTE — PROGRESS NOTE ADULT - ASSESSMENT
72 y/o M, former smoker (quit 16 years ago, 45 pack year history), with PMHx of prostate cancer (s/p prostatectomy), COPD, and urinary frequency.  Referred to Dr. Navas for evaluation of pulmonary nodule by Dr. Britta Camacho. Deemed a good candidate for intervention. On 1/10 he underwent an uncomplicated R VATS, RA RUL wedge completion right upper lobectomy and mediastinal lymph node dissection. Postop he was brought to [facility] with 1 CT in place. POD1 CT placed to water seal with stable CXR. POD 2 pt still with large airleak, mild SOB overnight which has resolved. Plan to continue with CT to waterseal. Slight increase in Cr. POD3, pt remains with air leak on waterseal, keeping chest tube to waterseal today. Cr downtrended. POD4, LAYA resolved. CT remains with air leak, keeping to waterseal. POD 5 pt failed clamp trial. Doxy pleurodesis preformed. POD 6 airleak resolved. Plan to keep CT to suction today.       Plan:    Neurovascular:   -Pain well controlled with current regimen. PRN's: tylenol, oxycodone    Cardiovascular:   -Hemodynamically stable.   -Monitor: BP, HR, tele  -HTN    -c/w metoprolol 12.5 BID    -c/w norvasc 5  -HLD    -c/w lipitor    Respiratory:   -Oxygenating well on room air  -Encourage continued use of IS 10x/hr and frequent ambulation  -CXR: WNL, no ptx  -CT management    -CT to suction today    GI:  -GI PPX: pepcid  -PO Diet  -Bowel Regimen: miralax, senna    Renal / :  -Continue to monitor renal function: BUN/Cr 24/1.15  -Monitor I/O's daily     Endocrine:    -No hx of DM or thyroid dx  -A1c: 6  -TSH: .,322    Hematologic:  -CBC: H/H- 11.7/36  -Coagulation Panel.    ID:  -Temperature: 37  -CBC: WBC- 10  -Continue to observe for SIRS/Sepsis Syndrome.    Prophylaxis:  -DVT prophylaxis with lovenox  -Continue with SCD's b/l while patient is at rest     Disposition:  -CT to suction today, likely waterseal trial tomorrow.  74 y/o M, former smoker (quit 16 years ago, 45 pack year history), with PMHx of prostate cancer (s/p prostatectomy), COPD, and urinary frequency.  Referred to Dr. Navas for evaluation of pulmonary nodule by Dr. Britta Camacho. Deemed a good candidate for intervention. On 1/10 he underwent an uncomplicated R VATS, RA RUL wedge completion right upper lobectomy and mediastinal lymph node dissection. Postop he was brought to [facility] with 1 CT in place. POD1 CT placed to water seal with stable CXR. POD 2 pt still with large airleak, mild SOB overnight which has resolved. Plan to continue with CT to waterseal. Slight increase in Cr. POD3, pt remains with air leak on waterseal, keeping chest tube to waterseal today. Cr downtrended. POD4, LAYA resolved. CT remains with air leak, keeping to waterseal. POD 5 pt failed clamp trial. Doxy pleurodesis preformed. POD 6 airleak resolved. Plan to keep CT to suction today.       Plan:    Neurovascular:   -Pain well controlled with current regimen. PRN's: tylenol, oxycodone    Cardiovascular:   -Hemodynamically stable.   -Monitor: BP, HR, tele  -HTN    -c/w metoprolol 12.5 BID    -c/w norvasc 5  -HLD    -c/w lipitor    Respiratory:   -Oxygenating well on room air  -Encourage continued use of IS 10x/hr and frequent ambulation  -CXR: WNL, no ptx  -CT management    -CT to suction today    GI:  -GI PPX: pepcid  -PO Diet  -Bowel Regimen: miralax, senna    Renal / :  -Continue to monitor renal function: BUN/Cr 24/1.15  -Monitor I/O's daily     Endocrine:    -No hx of DM or thyroid dx  -A1c: 6  -TSH: .,322    Hematologic:  -CBC: H/H- 11.7/36  -Coagulation Panel.    ID:  -Temperature: 37  -CBC: WBC- 10  -Continue to observe for SIRS/Sepsis Syndrome.    Prophylaxis:  -DVT prophylaxis with lovenox  -Continue with SCD's b/l while patient is at rest     Disposition:  -CT to suction today, likely waterseal trial tomorrow.

## 2024-01-16 NOTE — DIETITIAN INITIAL EVALUATION ADULT - ADD RECOMMEND
1. Continue Regular diet.   2. Monitor GI tolerance, weight trends, labs, & skin integrity.  3. Defer bowel and pain regimens to team.   4. RD to remain available for diet education/intervention prn.

## 2024-01-16 NOTE — DIETITIAN INITIAL EVALUATION ADULT - [id number]_CORE
01-15-24 @ 07:01  -  01-16-24 @ 07:00  --------------------------------------------------------  OUT:    Chest Tube (mL): 300 mL  Total OUT: 300 mL    Total NET: -300 mL

## 2024-01-16 NOTE — DIETITIAN INITIAL EVALUATION ADULT - OTHER INFO
74 y/o M, former smoker (quit 16 years ago, 45 pack year history), with PMHx of prostate cancer (s/p prostatectomy), COPD, and urinary frequency.  Referred to Dr. Navas for evaluation of pulmonary nodule by Dr. Britta Camacho. Deemed a good candidate for intervention. On 1/10 he underwent an uncomplicated R VATS, RA RUL wedge completion right upper lobectomy and mediastinal lymph node dissection.     Pt seen on 9LA for assessment. Labs and medication orders reviewed. Electrolytes WNL, BUN/Cr 24 <H>/1.15 WNL. On Regular diet. Pt reports good appetite and intake, RD observed 100% completed of breakfast this AM 1/16. Pt denies nausea/vomiting/diarrhea/constipation, reports last BM yesterday 1/15. Reports UBW ~176lb, admission wt 183lb/83.1kg noted. Pt reports no difficulty chewing/swallowing. Confirms NFKA. No Hoahaoism/ethnic/cultural food preferences noted. No pressure injuries or edema documented 1/16, surgical incision and skin tear noted, Mark score 19. See nutrition recommendations. RD to remain available.  74 y/o M, former smoker (quit 16 years ago, 45 pack year history), with PMHx of prostate cancer (s/p prostatectomy), COPD, and urinary frequency.  Referred to Dr. Navas for evaluation of pulmonary nodule by Dr. Britta Camacho. Deemed a good candidate for intervention. On 1/10 he underwent an uncomplicated R VATS, RA RUL wedge completion right upper lobectomy and mediastinal lymph node dissection.     Pt seen on 9LA for assessment. Labs and medication orders reviewed. Electrolytes WNL, BUN/Cr 24 <H>/1.15 WNL. On Regular diet. Pt reports good appetite and intake, RD observed 100% completed of breakfast this AM 1/16. Pt denies nausea/vomiting/diarrhea/constipation, reports last BM yesterday 1/15. Reports UBW ~176lb, admission wt 183lb/83.1kg noted. Pt reports no difficulty chewing/swallowing. Confirms NFKA. No Druze/ethnic/cultural food preferences noted. No pressure injuries or edema documented 1/16, surgical incision and skin tear noted, Mark score 19. See nutrition recommendations. RD to remain available.

## 2024-01-17 ENCOUNTER — TRANSCRIPTION ENCOUNTER (OUTPATIENT)
Age: 74
End: 2024-01-17

## 2024-01-17 VITALS — TEMPERATURE: 98 F

## 2024-01-17 PROBLEM — J43.9 EMPHYSEMA, UNSPECIFIED: Chronic | Status: ACTIVE | Noted: 2023-12-27

## 2024-01-17 PROBLEM — R06.02 SHORTNESS OF BREATH: Chronic | Status: ACTIVE | Noted: 2023-12-27

## 2024-01-17 PROBLEM — R94.8 ABNORMAL RESULTS OF FUNCTION STUDIES OF OTHER ORGANS AND SYSTEMS: Chronic | Status: ACTIVE | Noted: 2023-12-27

## 2024-01-17 PROBLEM — R11.2 NAUSEA WITH VOMITING, UNSPECIFIED: Chronic | Status: ACTIVE | Noted: 2024-01-09

## 2024-01-17 PROBLEM — Z86.010 PERSONAL HISTORY OF COLONIC POLYPS: Chronic | Status: ACTIVE | Noted: 2023-12-27

## 2024-01-17 PROBLEM — K57.92 DIVERTICULITIS OF INTESTINE, PART UNSPECIFIED, WITHOUT PERFORATION OR ABSCESS WITHOUT BLEEDING: Chronic | Status: ACTIVE | Noted: 2022-08-24

## 2024-01-17 PROBLEM — R91.1 SOLITARY PULMONARY NODULE: Chronic | Status: ACTIVE | Noted: 2023-12-27

## 2024-01-17 PROBLEM — N20.0 CALCULUS OF KIDNEY: Chronic | Status: ACTIVE | Noted: 2023-12-27

## 2024-01-17 PROBLEM — Z87.09 PERSONAL HISTORY OF OTHER DISEASES OF THE RESPIRATORY SYSTEM: Chronic | Status: ACTIVE | Noted: 2024-01-09

## 2024-01-17 PROBLEM — R35.0 FREQUENCY OF MICTURITION: Chronic | Status: ACTIVE | Noted: 2023-12-27

## 2024-01-17 LAB
ALBUMIN SERPL ELPH-MCNC: 3.7 G/DL — SIGNIFICANT CHANGE UP (ref 3.3–5)
ALP SERPL-CCNC: 105 U/L — SIGNIFICANT CHANGE UP (ref 40–120)
ALT FLD-CCNC: 61 U/L — HIGH (ref 10–45)
ANION GAP SERPL CALC-SCNC: 9 MMOL/L — SIGNIFICANT CHANGE UP (ref 5–17)
AST SERPL-CCNC: 51 U/L — HIGH (ref 10–40)
BILIRUB SERPL-MCNC: 0.2 MG/DL — SIGNIFICANT CHANGE UP (ref 0.2–1.2)
BUN SERPL-MCNC: 22 MG/DL — SIGNIFICANT CHANGE UP (ref 7–23)
CALCIUM SERPL-MCNC: 9.8 MG/DL — SIGNIFICANT CHANGE UP (ref 8.4–10.5)
CHLORIDE SERPL-SCNC: 104 MMOL/L — SIGNIFICANT CHANGE UP (ref 96–108)
CO2 SERPL-SCNC: 26 MMOL/L — SIGNIFICANT CHANGE UP (ref 22–31)
CREAT SERPL-MCNC: 1.14 MG/DL — SIGNIFICANT CHANGE UP (ref 0.5–1.3)
EGFR: 67 ML/MIN/1.73M2 — SIGNIFICANT CHANGE UP
GLUCOSE SERPL-MCNC: 148 MG/DL — HIGH (ref 70–99)
HCT VFR BLD CALC: 41.9 % — SIGNIFICANT CHANGE UP (ref 39–50)
HGB BLD-MCNC: 13.4 G/DL — SIGNIFICANT CHANGE UP (ref 13–17)
MAGNESIUM SERPL-MCNC: 2.4 MG/DL — SIGNIFICANT CHANGE UP (ref 1.6–2.6)
MCHC RBC-ENTMCNC: 30.1 PG — SIGNIFICANT CHANGE UP (ref 27–34)
MCHC RBC-ENTMCNC: 32 GM/DL — SIGNIFICANT CHANGE UP (ref 32–36)
MCV RBC AUTO: 94.2 FL — SIGNIFICANT CHANGE UP (ref 80–100)
NRBC # BLD: 0 /100 WBCS — SIGNIFICANT CHANGE UP (ref 0–0)
PLATELET # BLD AUTO: 283 K/UL — SIGNIFICANT CHANGE UP (ref 150–400)
POTASSIUM SERPL-MCNC: 5.4 MMOL/L — HIGH (ref 3.5–5.3)
POTASSIUM SERPL-SCNC: 5.4 MMOL/L — HIGH (ref 3.5–5.3)
PROT SERPL-MCNC: 7.4 G/DL — SIGNIFICANT CHANGE UP (ref 6–8.3)
RBC # BLD: 4.45 M/UL — SIGNIFICANT CHANGE UP (ref 4.2–5.8)
RBC # FLD: 13.8 % — SIGNIFICANT CHANGE UP (ref 10.3–14.5)
SODIUM SERPL-SCNC: 139 MMOL/L — SIGNIFICANT CHANGE UP (ref 135–145)
WBC # BLD: 10.09 K/UL — SIGNIFICANT CHANGE UP (ref 3.8–10.5)
WBC # FLD AUTO: 10.09 K/UL — SIGNIFICANT CHANGE UP (ref 3.8–10.5)

## 2024-01-17 PROCEDURE — S2900: CPT

## 2024-01-17 PROCEDURE — 85025 COMPLETE CBC W/AUTO DIFF WBC: CPT

## 2024-01-17 PROCEDURE — 85610 PROTHROMBIN TIME: CPT

## 2024-01-17 PROCEDURE — 88305 TISSUE EXAM BY PATHOLOGIST: CPT

## 2024-01-17 PROCEDURE — 86850 RBC ANTIBODY SCREEN: CPT

## 2024-01-17 PROCEDURE — 71045 X-RAY EXAM CHEST 1 VIEW: CPT

## 2024-01-17 PROCEDURE — 88331 PATH CONSLTJ SURG 1 BLK 1SPC: CPT

## 2024-01-17 PROCEDURE — 86900 BLOOD TYPING SEROLOGIC ABO: CPT

## 2024-01-17 PROCEDURE — 88341 IMHCHEM/IMCYTCHM EA ADD ANTB: CPT

## 2024-01-17 PROCEDURE — 86803 HEPATITIS C AB TEST: CPT

## 2024-01-17 PROCEDURE — C9399: CPT

## 2024-01-17 PROCEDURE — 88309 TISSUE EXAM BY PATHOLOGIST: CPT

## 2024-01-17 PROCEDURE — 94640 AIRWAY INHALATION TREATMENT: CPT

## 2024-01-17 PROCEDURE — 80053 COMPREHEN METABOLIC PANEL: CPT

## 2024-01-17 PROCEDURE — 36415 COLL VENOUS BLD VENIPUNCTURE: CPT

## 2024-01-17 PROCEDURE — 71045 X-RAY EXAM CHEST 1 VIEW: CPT | Mod: 26,76

## 2024-01-17 PROCEDURE — 83036 HEMOGLOBIN GLYCOSYLATED A1C: CPT

## 2024-01-17 PROCEDURE — 88307 TISSUE EXAM BY PATHOLOGIST: CPT

## 2024-01-17 PROCEDURE — 82962 GLUCOSE BLOOD TEST: CPT

## 2024-01-17 PROCEDURE — C1889: CPT

## 2024-01-17 PROCEDURE — 85730 THROMBOPLASTIN TIME PARTIAL: CPT

## 2024-01-17 PROCEDURE — 84443 ASSAY THYROID STIM HORMONE: CPT

## 2024-01-17 PROCEDURE — 86901 BLOOD TYPING SEROLOGIC RH(D): CPT

## 2024-01-17 PROCEDURE — 85027 COMPLETE CBC AUTOMATED: CPT

## 2024-01-17 PROCEDURE — 83735 ASSAY OF MAGNESIUM: CPT

## 2024-01-17 PROCEDURE — 80048 BASIC METABOLIC PNL TOTAL CA: CPT

## 2024-01-17 RX ORDER — SENNA PLUS 8.6 MG/1
2 TABLET ORAL
Qty: 60 | Refills: 0
Start: 2024-01-17 | End: 2024-02-15

## 2024-01-17 RX ORDER — ACETAMINOPHEN 500 MG
2 TABLET ORAL
Qty: 0 | Refills: 0 | DISCHARGE
Start: 2024-01-17

## 2024-01-17 RX ORDER — OXYCODONE HYDROCHLORIDE 5 MG/1
1 TABLET ORAL
Qty: 21 | Refills: 0
Start: 2024-01-17 | End: 2024-01-23

## 2024-01-17 RX ORDER — METOPROLOL TARTRATE 50 MG
0.5 TABLET ORAL
Qty: 30 | Refills: 0
Start: 2024-01-17 | End: 2024-02-15

## 2024-01-17 RX ORDER — FAMOTIDINE 10 MG/ML
1 INJECTION INTRAVENOUS
Qty: 60 | Refills: 0
Start: 2024-01-17 | End: 2024-02-15

## 2024-01-17 RX ORDER — LOVASTATIN 20 MG
1 TABLET ORAL
Qty: 0 | Refills: 0 | DISCHARGE

## 2024-01-17 RX ORDER — FUROSEMIDE 40 MG
10 TABLET ORAL ONCE
Refills: 0 | Status: COMPLETED | OUTPATIENT
Start: 2024-01-17 | End: 2024-01-17

## 2024-01-17 RX ADMIN — Medication 1000 MILLIGRAM(S): at 00:20

## 2024-01-17 RX ADMIN — LIDOCAINE 1 PATCH: 4 CREAM TOPICAL at 18:19

## 2024-01-17 RX ADMIN — Medication 1000 MILLIGRAM(S): at 17:05

## 2024-01-17 RX ADMIN — Medication 1000 MILLIGRAM(S): at 07:24

## 2024-01-17 RX ADMIN — LIDOCAINE 1 PATCH: 4 CREAM TOPICAL at 11:13

## 2024-01-17 RX ADMIN — Medication 1000 MILLIGRAM(S): at 06:21

## 2024-01-17 RX ADMIN — Medication 12.5 MILLIGRAM(S): at 10:16

## 2024-01-17 RX ADMIN — Medication 10 MILLIGRAM(S): at 11:14

## 2024-01-17 RX ADMIN — ENOXAPARIN SODIUM 40 MILLIGRAM(S): 100 INJECTION SUBCUTANEOUS at 17:05

## 2024-01-17 RX ADMIN — FAMOTIDINE 20 MILLIGRAM(S): 10 INJECTION INTRAVENOUS at 06:21

## 2024-01-17 RX ADMIN — Medication 5 MILLIGRAM(S): at 06:21

## 2024-01-17 RX ADMIN — FAMOTIDINE 20 MILLIGRAM(S): 10 INJECTION INTRAVENOUS at 17:05

## 2024-01-17 RX ADMIN — Medication 1000 MILLIGRAM(S): at 01:00

## 2024-01-17 RX ADMIN — Medication 5 MILLIGRAM(S): at 17:05

## 2024-01-17 RX ADMIN — Medication 1000 MILLIGRAM(S): at 11:13

## 2024-01-17 RX ADMIN — AMLODIPINE BESYLATE 5 MILLIGRAM(S): 2.5 TABLET ORAL at 06:21

## 2024-01-17 RX ADMIN — LIDOCAINE 1 PATCH: 4 CREAM TOPICAL at 12:49

## 2024-01-17 RX ADMIN — Medication 1000 MILLIGRAM(S): at 12:06

## 2024-01-17 RX ADMIN — Medication 1 APPLICATION(S): at 11:24

## 2024-01-17 NOTE — DISCHARGE NOTE PROVIDER - CARE PROVIDER_API CALL
Yosef, Subroto  Thoracic Surgery  [address] [address]  Phone: [phone]  Fax: [phone]  Scheduled Appointment: 01/25/2024 09:15 AM    Britta Camacho  Internal Medicine  [address] [place] [address]  Phone: [phone]  Fax: [phone]  Scheduled Appointment: 01/31/2024 10:30 AM

## 2024-01-17 NOTE — DISCHARGE NOTE NURSING/CASE MANAGEMENT/SOCIAL WORK - NSDCFUADDAPPT_GEN_ALL_CORE_FT
Please attend your follow up appointments, if you have any issues please call the office at [phone].

## 2024-01-17 NOTE — DISCHARGE NOTE NURSING/CASE MANAGEMENT/SOCIAL WORK - PATIENT PORTAL LINK FT
You can access the FollowMyHealth Patient Portal offered by [facility] by registering at the following website: [email or web address]. By joining [facility]’s FollowMyHealth portal, you will also be able to view your health information using other applications (apps) compatible with our system.

## 2024-01-17 NOTE — DISCHARGE NOTE PROVIDER - HOSPITAL COURSE
Patient discussed on morning rounds with  _____    Operation Date:    Primary Surgeon/Attending MD:    Referring Physician:  _ _ _ _ _ _ _ _ _ _ _ _  HOSPITAL COURSE:    _ _ _ _ _ _ _ _ _ _ _ _  DISCHARGE PHYSICAL EXAM:  General:  Neuro:  Cardio:  Pulm:  GI/:  Vascular:  Extremities:  Incisions:  _ _ _ _ _ _ _ _ _ _ _ _  REMOVAL CHECKLIST:        [ ] Epicardial wires          [ ] Stitches/tie downs,   If no, why? ______          [ ] PICC/Midline,   If no, why? ________  _ _ _ _ _ _ _ _ _ _ _ _   MEDICATION DISCHARGE CHECKLIST    CABG        [ ] Aspirin, [  ] Contraindicated, Reason_______________________________        [ ] Plavix, [  ] Contraindicated, Reason_______________________________        [ ] Statin, [  ] Contraindicated, Reason_______________________________        [ ] Lasix , [  ] Contraindicated, Reason_______________________________              Duration: _____        [ ] Beta-Blocker, [  ] Contraindicated, Reason_______________________________       Surgical Valve        [ ] Aspirin, [  ] Contraindicated, Reason_______________________________        [ ] Lasix, [  ] Contraindicated, Reason_______________________________             Duration: _____        [ ] Beta-Blocker, [  ] Contraindicated, Reason_______________________________        TAVR Valve        [ ] Aspirin, [  ] Contraindicated, Reason_______________________________        [ ] Plavix, [ ] Contraindicated, Reason _______________________________        PCI        [ ] Aspirin, [  ] Contraindicated, Reason_______________________________        [ ] Plavix, [ ] Contraindicated, Reason _______________________________        Anticoagulation        [ ] NOAC, [ ] Reason _______________________________              Cost/Insurance barriers addressed: YES/NO         [ ] Coumadin, [ ] Reason _______________________________              Dose:___________              INR Goal: ___________              Follow up established: YES/NO  _ _ _ _ _ _ _ _ _ _ _ _  RELEVANT LABS/IMAGING:    _ _ _ _ _ _ _ _ _ _ _ _  CLINICAL FOLLOW UP NEEDS:     [ ] Labwork           Labs needed:           When/Timing:           Outpatient team aware: YES/NO         [ ] Imaging            Type:           When/Timing:           Outpatient team aware: YES/NO       [ ] Home equipment           Type: (i.e. wound vac, pneumostat, prevena, wet/dry dressings, picc/midlines, MCOT, del real etc)           Specific needs:           Outpatient team aware: YES/NO  _ _ _ _ _ _ _ _ _ _ _ _  Over 35 minutes was spent with the patient reviewing the discharge material including medications, follow up appointments, recovery, concerning symptoms, and how to contact their health care providers if they have questions Patient discussed on morning rounds with Dr. Navas     Operation Date: 1/10 RVATS, RA, RUL wedge completion right upper lobectomy, MLND    Primary Surgeon/Attending MD: Dr. Navas     Referring Physician: DR. BRITTA CAMACHO   _ _ _ _ _ _ _ _ _ _ _ _  HOSPITAL COURSE:  72 y/o M, former smoker (quit 16 years ago, 45 pack year history), with PMHx of prostate cancer (s/p prostatectomy), COPD, and urinary frequency.  Referred to Dr. Navas for evaluation of pulmonary nodule by Dr. Britta Camacho. Deemed a good candidate for intervention. On 1/10 he underwent an uncomplicated R VATS, RA RUL wedge completion right upper lobectomy and mediastinal lymph node dissection. Postop he was brought to [facility] with 1 CT in place. POD1 CT placed to water seal with stable CXR. POD 2 pt still with large airleak, mild SOB overnight which has resolved. Plan to continue with CT to waterseal. Slight increase in Cr. POD3, pt remains with air leak on waterseal, keeping chest tube to waterseal today. Cr downtrended. POD4, LAYA resolved. CT remains with air leak, keeping to waterseal. POD 5 pt failed clamp trial. Doxy pleurodesis preformed. POD 6 airleak resolved. Ct kept to suction. On POD 7 CT was placed to waterseal at midnight with no airleak appreciated. In the AM, CXR was stable, stable subq emphysema and possible small apical ptx. CT removed without issues and post pull cxr was stable with tiny apical ptx and stable subq air. educated pt that this air will reabsorb but if for any reason noted to worsen to call the office. At time of discharge pt tolerated a PO diet, ambulated without assistance and had a post op BM. Pt denies dizziness, vision changes, chest pain, palpitations, shortness of breath, cough, n/v/d, extremity swelling, calf tenderness.   _ _ _ _ _ _ _ _ _ _ _ _  DISCHARGE PHYSICAL EXAM:  GEN: NAD, looks comfortable  Psych: Mood appropriate  Neuro: A&Ox3.  No focal deficits.  Moving all extremities.   HEENT: No obvious abnormalities  CV: S1S2, regular, no murmurs appreciated.  No carotid bruits.  No JVD  Lungs: Clear B/L.  No wheezing, rales or rhonchi  ABD: Soft, non-tender, non-distended  EXT: Warm and well perfused.  No peripheral edema noted  Musculoskeletal: Moving all extremities with normal ROM, no joint swelling  Incisions: VATs sites are clean dry and intact without drainage or bleeding, steristrips and tie down in place with occlusive dressing.   _ _ _ _ _ _ _ _ _ _ _ _  REMOVAL CHECKLIST:        [ ] Stitches/tie downs,   If no, why? CT site   _ _ _ _ _ _ _ _ _ _ _ _  RELEVANT LABS/IMAGING:  < from: Xray Chest 1 View-PORTABLE IMMEDIATE (Xray Chest 1 View-PORTABLE IMMEDIATE .) (01.17.24 @ 09:49) >    impression: Interim removal right chest tube, right apical trace   pneumothorax. Right basilar opacity and right subcutaneous emphysema,   unchanged. Elevation of the right diaphragm, new. Heart size within   normal limits, thoracic aortic calcification. Stable bony structures.    --- End of Report ---    < end of copied text >  _ _ _ _ _ _ _ _ _ _ _ _  Over 35 minutes was spent with the patient reviewing the discharge material including medications, follow up appointments, recovery, concerning symptoms, and how to contact their health care providers if they have questions

## 2024-01-17 NOTE — CHART NOTE - NSCHARTNOTEFT_GEN_A_CORE
Patient seen and examined. No air leak seen. Plan to continue chest tube to suction.
CT Removal:    Pt seen and examined at bedside.  Case discussed with Dr. Navas  and is recommending removal of CT.  Minimal output from CT.  No air leak appreciated.  CT removed without incident.  Occlusive dressing placed. Follow up CXR with tiny apical PTX noted.  Pt tolerated procedure well and remained hemodynamically stable.
Patient seen and examined. Doing well. Continue chest tube to water seal and diuresis.
Chemical pleurodesis with doxycycline completed at bedside. Patient tolerated procedure to the best of his ability. Chest tube to hang high for the next 4 hours before being lowered to floor level and placed on suction. CXR to be performed at 7pm once CT is placed to suction. Vital signs to be monitored every 30 minutes.

## 2024-01-17 NOTE — DISCHARGE NOTE PROVIDER - PROVIDER TOKENS
PROVIDER:[TOKEN:[[id number]],SCHEDULEDAPPT:[01/25/2024],SCHEDULEDAPPTTIME:[09:15 AM]],PROVIDER:[TOKEN:[[id number]],SCHEDULEDAPPT:[01/31/2024],SCHEDULEDAPPTTIME:[10:30 AM]]

## 2024-01-17 NOTE — DISCHARGE NOTE PROVIDER - NSDCMRMEDTOKEN_GEN_ALL_CORE_FT
acetaminophen 500 mg oral tablet: 2 tab(s) orally every 6 hours  albuterol 90 mcg/inh inhalation aerosol: 2 puff(s) inhaled as needed for  shortness of breath and/or wheezing every 4-6 hours  amLODIPine 5 mg oral tablet: 1 tab(s) orally once a day  Centrum 50 plus: 1 tab once a day  donepezil 10 mg oral tablet: 0.5 tab(s) orally once a day  famotidine 20 mg oral tablet: 1 tab(s) orally every 12 hours  fesoterodine 4 mg oral tablet, extended release: 1 tab(s) orally once a day (at bedtime)  lovastatin 10 mg oral tablet: 1 tab(s) orally once a day  metoprolol tartrate 25 mg oral tablet: 0.5 tab(s) orally every 12 hours  oxyCODONE 5 mg oral tablet: 1 tab(s) orally every 8 hours as needed for Moderate Pain (4 - 6) MDD: 3  senna leaf extract oral tablet: 2 tab(s) orally once a day (at bedtime)  TheraLith XR oral tablet: 1 tab(s) orally once a day  Trelegy Ellipta 200 mcg-62.5 mcg-25 mcg/inh inhalation powder: 1 puff(s) inhaled once a day

## 2024-01-17 NOTE — DISCHARGE NOTE PROVIDER - NSDCFUADDAPPT_GEN_ALL_CORE_FT
Please attend your follow up appointments, if you have any issues please call the office at [phone].

## 2024-01-17 NOTE — DISCHARGE NOTE PROVIDER - NSDCCPTREATMENT_GEN_ALL_CORE_FT
PRINCIPAL PROCEDURE  Procedure: Lobectomy, lung, upper lobe, right, robot-assisted  Findings and Treatment: You have undergone lung surgery, please remove the bandage over your chest tube site in 2 days, the suture that is in place will be removed at your follow up appointment. The steristrips over your incisions sites will fall off on their own.

## 2024-01-17 NOTE — DISCHARGE NOTE PROVIDER - CARE PROVIDERS DIRECT ADDRESSES
[email or web address]

## 2024-01-18 LAB — SURGICAL PATHOLOGY STUDY: SIGNIFICANT CHANGE UP

## 2024-01-22 DIAGNOSIS — C34.11 MALIGNANT NEOPLASM OF UPPER LOBE, RIGHT BRONCHUS OR LUNG: ICD-10-CM

## 2024-01-22 DIAGNOSIS — Z87.891 PERSONAL HISTORY OF NICOTINE DEPENDENCE: ICD-10-CM
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
## 2024-01-22 DIAGNOSIS — J95.812 POSTPROCEDURAL AIR LEAK: ICD-10-CM

## 2024-01-22 DIAGNOSIS — J43.9 EMPHYSEMA, UNSPECIFIED: ICD-10-CM

## 2024-01-22 DIAGNOSIS — Z85.46 PERSONAL HISTORY OF MALIGNANT NEOPLASM OF PROSTATE: ICD-10-CM

## 2024-01-22 DIAGNOSIS — Z87.442 PERSONAL HISTORY OF URINARY CALCULI: ICD-10-CM

## 2024-01-22 DIAGNOSIS — N17.9 ACUTE KIDNEY FAILURE, UNSPECIFIED: ICD-10-CM

## 2024-01-22 DIAGNOSIS — Z86.010 PERSONAL HISTORY OF COLONIC POLYPS: ICD-10-CM

## 2024-01-22 DIAGNOSIS — Z90.79 ACQUIRED ABSENCE OF OTHER GENITAL ORGAN(S): ICD-10-CM

## 2024-01-22 PROBLEM — J93.9 PNEUMOTHORAX ON RIGHT: Status: ACTIVE | Noted: 2024-01-22

## 2024-01-22 PROBLEM — T81.82XA: Status: ACTIVE | Noted: 2024-01-22

## 2024-01-23 NOTE — REASON FOR VISIT
[de-identified] : 1.  Right robotic assisted/video assisted thoracoscopic diagnostic right upper lobe wedge. 2.  Right robotic assisted/video assisted thoracoscopic completion right upper lobectomy. 3.  Right robotic assisted/video assisted thoracoscopic surgery of mediastinal lymph node dissection.  [de-identified] : 1/10/24 [de-identified] : 2 [de-identified] : Post-op course complicated by air leak syndrome and LAYA which resolved prior to d/c home. Doxy pleurodesis performed on POD # 5 and chest tube removed on POD # 7. CXR post removal showed a tiny apical pneumothorax on the right and subcutaneous emphysema. D/C home on POD # 7.   PATHO:  pT1cN0, Invasive squamous cell carcinoma, moderately differentiated, nonkeratinizing type, Total tumor size 2.7 cm, VPI not identified, LVI not identified, 0/15 lymph nodes examined & negative; all margins negative.  Right level 9 lymph node: One lymph node, negative for carcinoma (0/1)   Level 7 lymph node: Two lymph nodes, negative for carcinoma (0/2)  Right level 4 lymph node: Six lymph nodes, negative for carcinoma (0/6)  Right level 12 lymph node: One lymph node, negative for carcinoma (0/1).   Right upper lobe; completion lobectomy: Negative for carcinoma  Bronchial, vascular, and parenchymal resection margins are negative for carcinoma  Five hilar lymph nodes: negative for carcinoma (0/5)    He presents today for his first post-op visit with CXR findings as noted below:  CXR 1/25/24: XXX  He reports....

## 2024-01-23 NOTE — ASSESSMENT
[FreeTextEntry1] : Mr. Petersen is a 74-year-old M, former smoker (quit 16 years ago, smoked for 30 yers 1.5 ppd), with PMHx of prostate cancer s/p prostatectomy, COPD, and urinary frequency. He was referred by Dr. Camacho for an initial evaluation of a pulmonary nodule. He was symptomatic with SOB on exertion, but denied hemoptysis, weight change, nausea, vomiting, diarrhea, chest pain, fever, or any recent illnesses.  On 1/10/24 he underwent a right VATS, robotic assisted, thorascopic complete right upper lobe lobectomy w/ mediastinal lymph node dissection.   He presents today for his first post-op visit with CXR.

## 2024-01-25 ENCOUNTER — OUTPATIENT (OUTPATIENT)
Dept: OUTPATIENT SERVICES | Facility: HOSPITAL | Age: 74
LOS: 1 days | End: 2024-01-25
Payer: COMMERCIAL

## 2024-01-25 ENCOUNTER — APPOINTMENT (OUTPATIENT)
Dept: THORACIC SURGERY | Facility: CLINIC | Age: 74
End: 2024-01-25
Payer: COMMERCIAL

## 2024-01-25 VITALS
SYSTOLIC BLOOD PRESSURE: 163 MMHG | RESPIRATION RATE: 16 BRPM | HEIGHT: 63 IN | DIASTOLIC BLOOD PRESSURE: 74 MMHG | WEIGHT: 186 LBS | TEMPERATURE: 97.3 F | BODY MASS INDEX: 32.96 KG/M2 | HEART RATE: 57 BPM | OXYGEN SATURATION: 96 %

## 2024-01-25 DIAGNOSIS — H26.9 UNSPECIFIED CATARACT: Chronic | ICD-10-CM

## 2024-01-25 DIAGNOSIS — Z90.89 ACQUIRED ABSENCE OF OTHER ORGANS: Chronic | ICD-10-CM

## 2024-01-25 DIAGNOSIS — Z96.89 PRESENCE OF OTHER SPECIFIED FUNCTIONAL IMPLANTS: Chronic | ICD-10-CM

## 2024-01-25 DIAGNOSIS — T81.82XA: ICD-10-CM

## 2024-01-25 DIAGNOSIS — J93.9 PNEUMOTHORAX, UNSPECIFIED: ICD-10-CM

## 2024-01-25 DIAGNOSIS — Z98.890 OTHER SPECIFIED POSTPROCEDURAL STATES: Chronic | ICD-10-CM

## 2024-01-25 PROCEDURE — 71046 X-RAY EXAM CHEST 2 VIEWS: CPT

## 2024-01-25 PROCEDURE — 99024 POSTOP FOLLOW-UP VISIT: CPT

## 2024-01-25 PROCEDURE — 71046 X-RAY EXAM CHEST 2 VIEWS: CPT | Mod: 26

## 2024-03-02 PROBLEM — Z90.2 S/P LOBECTOMY OF LUNG: Status: ACTIVE | Noted: 2024-01-22

## 2024-03-02 PROBLEM — Z09 POSTOP CHECK: Status: ACTIVE | Noted: 2024-01-22

## 2024-03-07 ENCOUNTER — APPOINTMENT (OUTPATIENT)
Dept: THORACIC SURGERY | Facility: CLINIC | Age: 74
End: 2024-03-07
Payer: COMMERCIAL

## 2024-03-07 DIAGNOSIS — C34.91 MALIGNANT NEOPLASM OF UNSPECIFIED PART OF RIGHT BRONCHUS OR LUNG: ICD-10-CM

## 2024-03-07 DIAGNOSIS — Z90.2 ACQUIRED ABSENCE OF LUNG [PART OF]: ICD-10-CM

## 2024-03-07 DIAGNOSIS — Z09 ENCOUNTER FOR FOLLOW-UP EXAMINATION AFTER COMPLETED TREATMENT FOR CONDITIONS OTHER THAN MALIGNANT NEOPLASM: ICD-10-CM

## 2024-03-07 PROCEDURE — 99024 POSTOP FOLLOW-UP VISIT: CPT

## 2024-03-07 NOTE — ASSESSMENT
[FreeTextEntry1] : 74-year-old M, former smoker (smoked for 30 yrs 1.5 PPD; quit 16 yrs ago), with a PMHx of prostate cancer s/p prostatectomy, COPD, and urinary frequency. He was referred by Dr. Camacho for an initial evaluation of a pulmonary nodule. He was symptomatic with SOB on exertion, but denied hemoptysis, weight change, nausea, vomiting, diarrhea, chest pain, fever, or any recent illnesses.  On 1/10/24, he underwent a right VATS, robotic assisted, thorascopic complete right upper lobe lobectomy w/ mediastinal lymph node dissection.  Surgical pathology revealed pT1cN0, Invasive squamous cell carcinoma, moderately differentiated, nonkeratinizing type, total tumor size 2.7 cm, VPI not identified, LVI not identified, 0/15 lymph nodes examined & negative; all margins negative. No adjuvant chemotherapy indicated.   Post-op CXR showed no evidence of pneumothorax, pleural effusion, or pneumothorax. His pain was well managed and he was resuming activities as tolerated with no heavy lifting.  He was referred to Dr. Aman Hernandez for a consultation.  He presents today via tele-health for a follow-up visit to review his symptoms and post-op recovery.  He reports some occasional pain.  He is doing well. He saw his oncologist who recommends surveillance as well. We will obtain a surveillance chest CT in 6 months.  Plan: Chest CT 6 months  I, Dr. Estuardo Navas MD, personally performed the evaluation and management (E/M) services for this established patient who presents today with (a) new problem(s)/exacerbation of (an) existing condition(s).  That E/M includes conducting the clinically appropriate interval history &/or exam, assessing all new/exacerbated conditions, and establishing a new plan of care. I have also independently reviewed the medical records and imaging at the time of this office visit, and discussed the above mentioned images with interpretations with the patient. Today, my ERICH was here to observe &/or participate in the visit & follow plan of care established by me.  Total time of 10 minutes with > 50% spent with the patient discussing postoperative course and need for surveillance.

## 2024-03-07 NOTE — HISTORY OF PRESENT ILLNESS
[Home] : at home, [unfilled] , at the time of the visit. [Medical Office: ([facility])___] : at the medical office located in  [Verbal consent obtained from patient] : the patient, [unfilled] [FreeTextEntry1] : Rodrigo Petersen is a 74-year-old M, former smoker (smoked for 30 yrs 1.5 PPD; quit 16 yrs ago), with a PMHx of prostate cancer s/p prostatectomy, COPD, and urinary frequency. He was referred by Dr. Camacho for an initial evaluation of a pulmonary nodule. He was symptomatic with SOB on exertion, but denied hemoptysis, weight change, nausea, vomiting, diarrhea, chest pain, fever, or any recent illnesses.  On 1/10/24, he underwent a right VATS, robotic assisted, thorascopic complete right upper lobe lobectomy w/ mediastinal lymph node dissection.  Surgical pathology revealed pT1cN0, Invasive squamous cell carcinoma, moderately differentiated, nonkeratinizing type, total tumor size 2.7 cm, VPI not identified, LVI not identified, 0/15 lymph nodes examined & negative; all margins negative. No adjuvant chemotherapy indicated.   Post-op CXR showed no evidence of pneumothorax, pleural effusion, or pneumothorax. His pain was well managed and he was resuming activities as tolerated with no heavy lifting.  He was referred to Dr. Aman Hernandez for a consultation.  He presents today via tele-health for a follow-up visit to review his symptoms and post-op recovery.  He reports some occasional pain.

## 2024-03-08 PROBLEM — C34.91 SQUAMOUS CELL CARCINOMA OF RIGHT LUNG: Status: ACTIVE | Noted: 2024-03-08

## 2024-07-24 ENCOUNTER — NON-APPOINTMENT (OUTPATIENT)
Age: 74
End: 2024-07-24

## (undated) DEVICE — XI ENDOWRIST STAPLER SHEATH

## (undated) DEVICE — DRSG STERISTRIPS 0.5 X 4"

## (undated) DEVICE — XI STAPLER SUREFORM 45

## (undated) DEVICE — XI DRAPE ARM

## (undated) DEVICE — XI DRAPE COLUMN

## (undated) DEVICE — ENDOCATCH GENERAL 15MM (PURPLE)

## (undated) DEVICE — GLV 8 PROTEXIS (WHITE)

## (undated) DEVICE — XI ENDOWRIST 12 - 8 MM CANNULA REDUCER

## (undated) DEVICE — ELCTR BOVIE TIP SPATULA MEGADYNE E-Z CLEAN LAPAROSCOPIC 13.5" STANDARD

## (undated) DEVICE — TROCAR ETHICON ENDOPATH XCEL BLADELESS 12MM X 100MM SMOOTH

## (undated) DEVICE — XI ARM GRASPER TIP UP FENESTRATED

## (undated) DEVICE — SUT PROLENE 0 30" CT-1

## (undated) DEVICE — SUT VLOC 90 4-0 9" CV-23 VIOLET

## (undated) DEVICE — NDL WILLIAMS CYSTOSCOPIC INJECTION 5FR 23G X 35CM

## (undated) DEVICE — SUT PDS II 2-0 27" SH

## (undated) DEVICE — GOWN XL W TOWEL

## (undated) DEVICE — D HELP - CLEARVIEW CLEARIFY SYSTEM

## (undated) DEVICE — ENDOCATCH GENERAL 10MM (PURPLE)

## (undated) DEVICE — GOWN XXL

## (undated) DEVICE — TROCAR COVIDIEN VERSAPORT BLADELESS OPTICAL 15MM STANDARD

## (undated) DEVICE — ELCTR GROUNDING PAD ADULT COVIDIEN

## (undated) DEVICE — CHEST DRAIN PLEUR-EVAC DRY/WET ADULT-PEDS SINGLE (QUICK)

## (undated) DEVICE — GLV 7 PROTEXIS (WHITE)

## (undated) DEVICE — XI SEAL UNIV 5- 8 MM

## (undated) DEVICE — XI STAPLER SUREFORM 60

## (undated) DEVICE — XI TIP COVER

## (undated) DEVICE — XI VESSEL SEALER

## (undated) DEVICE — Device

## (undated) DEVICE — TROCAR ETHICON ENDOPATH XCEL BLADELESS 15MM X 100MM STABILITY

## (undated) DEVICE — ELCTR BOVIE PENCIL HANDPIECE ROCKER SWITCH 15FT

## (undated) DEVICE — XI 12MM AND STAPLER CANNULA SEAL

## (undated) DEVICE — TROCAR ETHICON ENDOPATH XCEL BLADELESS 5MM X 100MM STABILITY

## (undated) DEVICE — VENODYNE/SCD SLEEVE CALF MEDIUM

## (undated) DEVICE — TROCAR ETHICON ENDOPATH XCEL BLADELESS 12MM X 100MM STABILITY

## (undated) DEVICE — XI SEAL UNIVERSIAL 5-12MM

## (undated) DEVICE — TUBING STRYKER PNEUMOCLEAR SMOKE HEAT HUMID

## (undated) DEVICE — TUBING STRYKER PNEUMOCLEAR HEAT HUMID

## (undated) DEVICE — VESSEL LOOP EXTRA MAXI-BLUE 0.200" X 22"

## (undated) DEVICE — STAPLER COVIDIEN ENDO GIA STANDARD HANDLE

## (undated) DEVICE — VESSEL LOOP MINI-BLUE 0.075" X 16"

## (undated) DEVICE — DVC ASCOPE 4 SNGL USE SLIM

## (undated) DEVICE — WARMING BLANKET LOWER ADULT

## (undated) DEVICE — DRAPE LIGHT HANDLE COVER (BLUE)

## (undated) DEVICE — PACK ROBOTIC

## (undated) DEVICE — DRSG MASTISOL

## (undated) DEVICE — XI OBTURATOR OPTICAL BLADELESS 8MM

## (undated) DEVICE — ENDOCATCH II 15MM

## (undated) DEVICE — DRSG GAUZE PACKTNER ROLL

## (undated) DEVICE — XI ENDOWRIST SUCTION IRRIGATOR 8MM